# Patient Record
Sex: FEMALE | Race: WHITE | Employment: PART TIME | URBAN - METROPOLITAN AREA
[De-identification: names, ages, dates, MRNs, and addresses within clinical notes are randomized per-mention and may not be internally consistent; named-entity substitution may affect disease eponyms.]

---

## 2024-05-29 ENCOUNTER — LAB ENCOUNTER (OUTPATIENT)
Dept: LAB | Age: 39
End: 2024-05-29
Attending: PHYSICIAN ASSISTANT
Payer: COMMERCIAL

## 2024-05-29 DIAGNOSIS — Z00.00 ROUTINE GENERAL MEDICAL EXAMINATION AT A HEALTH CARE FACILITY: Primary | ICD-10-CM

## 2024-05-29 DIAGNOSIS — R53.83 FATIGUE: ICD-10-CM

## 2024-05-29 DIAGNOSIS — R79.0 LOW FERRITIN: ICD-10-CM

## 2024-05-29 LAB
ALBUMIN SERPL-MCNC: 4.3 G/DL (ref 3.4–5)
ALBUMIN/GLOB SERPL: 1.2 {RATIO} (ref 1–2)
ALP LIVER SERPL-CCNC: 88 U/L
ALT SERPL-CCNC: 23 U/L
ANION GAP SERPL CALC-SCNC: 7 MMOL/L (ref 0–18)
AST SERPL-CCNC: 24 U/L (ref 15–37)
BILIRUB SERPL-MCNC: 0.6 MG/DL (ref 0.1–2)
BUN BLD-MCNC: 10 MG/DL (ref 9–23)
CALCIUM BLD-MCNC: 9.2 MG/DL (ref 8.5–10.1)
CHLORIDE SERPL-SCNC: 109 MMOL/L (ref 98–112)
CHOLEST SERPL-MCNC: 152 MG/DL (ref ?–200)
CO2 SERPL-SCNC: 27 MMOL/L (ref 21–32)
CREAT BLD-MCNC: 0.77 MG/DL
EGFRCR SERPLBLD CKD-EPI 2021: 102 ML/MIN/1.73M2 (ref 60–?)
FASTING PATIENT LIPID ANSWER: YES
FASTING STATUS PATIENT QL REPORTED: YES
GLOBULIN PLAS-MCNC: 3.7 G/DL (ref 2.8–4.4)
GLUCOSE BLD-MCNC: 80 MG/DL (ref 70–99)
HDLC SERPL-MCNC: 48 MG/DL (ref 40–59)
IRON SERPL-MCNC: 123 UG/DL
LDLC SERPL CALC-MCNC: 93 MG/DL (ref ?–100)
NONHDLC SERPL-MCNC: 104 MG/DL (ref ?–130)
OSMOLALITY SERPL CALC.SUM OF ELEC: 294 MOSM/KG (ref 275–295)
POTASSIUM SERPL-SCNC: 3.7 MMOL/L (ref 3.5–5.1)
PROT SERPL-MCNC: 8 G/DL (ref 6.4–8.2)
SODIUM SERPL-SCNC: 143 MMOL/L (ref 136–145)
TRIGL SERPL-MCNC: 50 MG/DL (ref 30–149)
TSI SER-ACNC: 1.8 MIU/ML (ref 0.36–3.74)
VLDLC SERPL CALC-MCNC: 8 MG/DL (ref 0–30)

## 2024-05-29 PROCEDURE — 83540 ASSAY OF IRON: CPT

## 2024-05-29 PROCEDURE — 80061 LIPID PANEL: CPT

## 2024-05-29 PROCEDURE — 84443 ASSAY THYROID STIM HORMONE: CPT

## 2024-05-29 PROCEDURE — 36415 COLL VENOUS BLD VENIPUNCTURE: CPT

## 2024-05-29 PROCEDURE — 80053 COMPREHEN METABOLIC PANEL: CPT

## 2024-10-12 LAB
AMB EXT BILIRUBIN, TOTAL: 0.5 MG/DL
AMB EXT BUN: 11 MG/DL
AMB EXT CALCIUM: 10
AMB EXT CARBON DIOXIDE: 23
AMB EXT CHLORIDE: 99
AMB EXT CHOLESTEROL, TOTAL: 189 MG/DL
AMB EXT CMP ALT: 21 U/L
AMB EXT CMP AST: 22 U/L
AMB EXT CREATININE: 0.78 MG/DL
AMB EXT EGFR NON-AA: 100
AMB EXT GLUCOSE: 84 MG/DL
AMB EXT HDL CHOLESTEROL: 46 MG/DL
AMB EXT HEMATOCRIT: 44.9
AMB EXT HEMOGLOBIN: 14.6
AMB EXT HGBA1C: 5.5 %
AMB EXT LDL CHOLESTEROL, DIRECT: 127 MG/DL
AMB EXT MCV: 97
AMB EXT PLATELETS: 197
AMB EXT POSTASSIUM: 3.6 MMOL/L
AMB EXT SODIUM: 138 MMOL/L
AMB EXT TOTAL PROTEIN: 7.7
AMB EXT TRIGLYCERIDES: 90 MG/DL
AMB EXT VITAMIN D, 25-HYDROXY: 39.3
AMB EXT VLDL: 16 MG/DL
AMB EXT WBC: 5 X10(3)UL

## 2024-11-23 ENCOUNTER — OFFICE VISIT (OUTPATIENT)
Dept: INTERNAL MEDICINE CLINIC | Facility: CLINIC | Age: 39
End: 2024-11-23
Payer: COMMERCIAL

## 2024-11-23 ENCOUNTER — LAB ENCOUNTER (OUTPATIENT)
Dept: LAB | Age: 39
End: 2024-11-23
Attending: PHYSICIAN ASSISTANT
Payer: COMMERCIAL

## 2024-11-23 VITALS
RESPIRATION RATE: 16 BRPM | DIASTOLIC BLOOD PRESSURE: 62 MMHG | SYSTOLIC BLOOD PRESSURE: 114 MMHG | HEART RATE: 78 BPM | HEIGHT: 63.78 IN | TEMPERATURE: 98 F | WEIGHT: 142.19 LBS | OXYGEN SATURATION: 98 % | BODY MASS INDEX: 24.58 KG/M2

## 2024-11-23 DIAGNOSIS — R53.83 OTHER FATIGUE: ICD-10-CM

## 2024-11-23 DIAGNOSIS — G89.29 CHRONIC LEFT-SIDED LOW BACK PAIN WITHOUT SCIATICA: Primary | ICD-10-CM

## 2024-11-23 DIAGNOSIS — E53.8 B12 DEFICIENCY: ICD-10-CM

## 2024-11-23 DIAGNOSIS — Z78.9 VEGETARIAN DIET: ICD-10-CM

## 2024-11-23 DIAGNOSIS — R10.2 CHRONIC PELVIC PAIN IN FEMALE: ICD-10-CM

## 2024-11-23 DIAGNOSIS — N80.9 ENDOMETRIOSIS: ICD-10-CM

## 2024-11-23 DIAGNOSIS — G89.29 CHRONIC LEFT SI JOINT PAIN: ICD-10-CM

## 2024-11-23 DIAGNOSIS — Q79.60 EDS (EHLERS-DANLOS SYNDROME) (HCC): ICD-10-CM

## 2024-11-23 DIAGNOSIS — G89.29 CHRONIC PELVIC PAIN IN FEMALE: ICD-10-CM

## 2024-11-23 DIAGNOSIS — G43.809 OTHER MIGRAINE WITHOUT STATUS MIGRAINOSUS, NOT INTRACTABLE: ICD-10-CM

## 2024-11-23 DIAGNOSIS — M53.3 CHRONIC LEFT SI JOINT PAIN: ICD-10-CM

## 2024-11-23 DIAGNOSIS — M54.50 CHRONIC LEFT-SIDED LOW BACK PAIN WITHOUT SCIATICA: Primary | ICD-10-CM

## 2024-11-23 DIAGNOSIS — R63.5 WEIGHT GAIN: ICD-10-CM

## 2024-11-23 LAB
CRP SERPL-MCNC: <0.4 MG/DL (ref ?–0.5)
ERYTHROCYTE [SEDIMENTATION RATE] IN BLOOD: 20 MM/HR

## 2024-11-23 PROCEDURE — 85652 RBC SED RATE AUTOMATED: CPT | Performed by: PHYSICIAN ASSISTANT

## 2024-11-23 PROCEDURE — 3078F DIAST BP <80 MM HG: CPT | Performed by: PHYSICIAN ASSISTANT

## 2024-11-23 PROCEDURE — 3074F SYST BP LT 130 MM HG: CPT | Performed by: PHYSICIAN ASSISTANT

## 2024-11-23 PROCEDURE — 86140 C-REACTIVE PROTEIN: CPT | Performed by: PHYSICIAN ASSISTANT

## 2024-11-23 PROCEDURE — 3008F BODY MASS INDEX DOCD: CPT | Performed by: PHYSICIAN ASSISTANT

## 2024-11-23 PROCEDURE — 99215 OFFICE O/P EST HI 40 MIN: CPT | Performed by: PHYSICIAN ASSISTANT

## 2024-11-23 PROCEDURE — G2211 COMPLEX E/M VISIT ADD ON: HCPCS | Performed by: PHYSICIAN ASSISTANT

## 2024-11-23 RX ORDER — ATOGEPANT 60 MG/1
60 TABLET ORAL DAILY
COMMUNITY
Start: 2022-11-17
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
## 2024-12-03 ENCOUNTER — TELEPHONE (OUTPATIENT)
Dept: INTERNAL MEDICINE CLINIC | Facility: CLINIC | Age: 39
End: 2024-12-03

## 2024-12-03 LAB
FSH: 6.8
LH: 6.2
PROGESTERONE: 0.1 NG/ML
TESTOSTERONE: 28

## 2024-12-06 ENCOUNTER — HOSPITAL ENCOUNTER (OUTPATIENT)
Dept: GENERAL RADIOLOGY | Age: 39
Discharge: HOME | End: 2024-12-06
Attending: PHYSICIAN ASSISTANT
Payer: COMMERCIAL

## 2024-12-06 DIAGNOSIS — M53.3 CHRONIC LEFT SI JOINT PAIN: ICD-10-CM

## 2024-12-06 DIAGNOSIS — G89.29 CHRONIC LEFT-SIDED LOW BACK PAIN WITHOUT SCIATICA: ICD-10-CM

## 2024-12-06 DIAGNOSIS — G89.29 CHRONIC LEFT SI JOINT PAIN: ICD-10-CM

## 2024-12-06 DIAGNOSIS — M54.50 CHRONIC LEFT-SIDED LOW BACK PAIN WITHOUT SCIATICA: ICD-10-CM

## 2024-12-06 PROCEDURE — 72110 X-RAY EXAM L-2 SPINE 4/>VWS: CPT | Performed by: PHYSICIAN ASSISTANT

## 2024-12-06 PROCEDURE — 72202 X-RAY EXAM SI JOINTS 3/> VWS: CPT | Performed by: PHYSICIAN ASSISTANT

## 2024-12-19 ENCOUNTER — HOSPITAL ENCOUNTER (OUTPATIENT)
Dept: ULTRASOUND IMAGING | Age: 39
Discharge: HOME | End: 2024-12-19
Attending: PHYSICIAN ASSISTANT
Payer: COMMERCIAL

## 2024-12-19 DIAGNOSIS — G89.29 CHRONIC PELVIC PAIN IN FEMALE: ICD-10-CM

## 2024-12-19 DIAGNOSIS — N80.9 ENDOMETRIOSIS: ICD-10-CM

## 2024-12-19 DIAGNOSIS — R10.2 CHRONIC PELVIC PAIN IN FEMALE: ICD-10-CM

## 2024-12-19 PROCEDURE — 76830 TRANSVAGINAL US NON-OB: CPT | Performed by: PHYSICIAN ASSISTANT

## 2024-12-19 PROCEDURE — 76856 US EXAM PELVIC COMPLETE: CPT | Performed by: PHYSICIAN ASSISTANT

## 2025-01-13 ENCOUNTER — OFFICE VISIT (OUTPATIENT)
Facility: CLINIC | Age: 40
End: 2025-01-13
Payer: COMMERCIAL

## 2025-01-13 ENCOUNTER — TELEPHONE (OUTPATIENT)
Facility: CLINIC | Age: 40
End: 2025-01-13

## 2025-01-13 VITALS
BODY MASS INDEX: 24.61 KG/M2 | DIASTOLIC BLOOD PRESSURE: 68 MMHG | WEIGHT: 142.38 LBS | HEIGHT: 63.78 IN | HEART RATE: 88 BPM | SYSTOLIC BLOOD PRESSURE: 120 MMHG

## 2025-01-13 DIAGNOSIS — Z23 NONAVALENT HUMAN PAPILLOMA VIRUS (HPV) VACCINE FOR HPV TYPES 6, 11, 16, 18, 31, 33, 45, 52, AND 58 ADMINISTERED: ICD-10-CM

## 2025-01-13 DIAGNOSIS — N80.9 ENDOMETRIOSIS: ICD-10-CM

## 2025-01-13 DIAGNOSIS — R10.2 PELVIC PAIN: ICD-10-CM

## 2025-01-13 DIAGNOSIS — Z01.419 WELL WOMAN EXAM WITH ROUTINE GYNECOLOGICAL EXAM: Primary | ICD-10-CM

## 2025-01-13 NOTE — TELEPHONE ENCOUNTER
Spoke with patient. Let her know to call the office back when she decides and as for [facility] our Surgery Scheduler. Understanding verbalized.

## 2025-01-13 NOTE — H&P
[facility]  Obstetrics and Gynecology   History & Physical      Chief complaint:   Chief Complaint   Patient presents with    Wellness Visit     Last pap 23 neg per pt     Other     Reviewed Preventative/Wellness form with patient.     Consult     Consult hysterectomy        Subjective:     HPI: Abiola Lindsey is a 39 year old  presenting for WWE, endometriosis .    Her PCP is Mateo Coleman DO.    Endometriosis - pelvic pain, HMB, diarrhea, tail bone/back bone pain, no fertility issues though (dx after having children)   - Dx 2018 on lsc, started on POP  + low back and pelvic pain - improved after surgery and on being on POP  Has gained weight since being on POP.  Normally 110 lbs and now 142 lbs.  Vegetarian, exercises daily, no substances.   For the first 2 years gaining weight consistently and didn't realize it was linked to her birth control.      Menstrual migraines - gets fuzzy vision   - improved with POP, quilipta, hasn't needed ubrelvy.  Knows to avoid big triggers     Has been on mirena IUD and had horrible migraines and very painful periods in between her periods.      Menstrual history:  - is still taking the norethindrone   - no periods  - still having pelvic pain - increase in frequency, gets shooting pain with certain movements or coughing/sneeze  - has cyclic bloating even throughout the day    PCOS screen  - hirsutism, acne, male-pattern hair loss? Does have acne   - snoring -no , sleep study negative     Family planning / Preconception:  - satisfied parity     Sexual history:  - Sexually active? Yes   - Sexual satisfaction?:  not pleasurable, painful.  Painful on insertion and deep penetration.  Low libido.    - Sexual preference: men   - STD history: no   - Interested in STD testing: no   - Post coital bleeding: no     Gynecologic Hygiene:  - history of recurrent bacteria vaginosis, yeast, UTIs: couple of UTIs after daughter born - associated with stitches    - Vulvar: Water  - Douche? No   - Underwear fully covers vulva? Yes   - Underwear with cotton liner or made of cotton? Yes     Cancer Screening:  Family history of gynecologic cancers (including breast):  - maternal: mom had uterine cancer, sister had ovary removed 18-18 yo benign   - paternal: aunt  of breast cancer   Cervical CA:   - last pap/HPV: 23 normal   - due for one today? :  no   - History of abnl pap/HPV: 1x in between 2 kids - did not need colpo  - received HPV vaccine: doesn't think   Breast cancer:   - any breast issues today?: no   - last mammogram - never  - due for mammogram today? :-   Colon cancer:   - family history:   - last colonoscopy: -    Other screening:  Osteoporosis:   - family history: -  - last DEXA: -  Lifestyle:   - Nutrition: whole foods, vegetarian   - Exercise: everyday   - Sleep: 6-7 hours  - Stress mgt / coping strategies: activity, outdoor times, sunshine, reading, spending time with her kids   - People who support your health: 2 best girlfriends     OB History  OB History    Para Term  AB Living   2 2 2 0 0 2   SAB IAB Ectopic Multiple Live Births   0 0 0 0 2     OB History    Para Term  AB Living   2 2 2     2   SAB IAB Ectopic Multiple Live Births           2      # Outcome Date GA Lbr Heri/2nd Weight Sex Type Anes PTL Lv   2 Term 18 39w0d  8 lb 14 oz (4.026 kg)  NORMAL SPONT   JESSENIA      Birth Comments: hemorrhage 8 hour postpartum   1 Term 15 38w0d  8 lb (3.629 kg) M NORMAL SPONT   JESSENIA       ROS negative unless otherwise stated above    Meds:  Medications Ordered Prior to Encounter[1]    PMH:  Past Medical History:    Anxiety    Dysmenorrhea    Dyspareunia    Camelia-Danlos syndrome type III (HCC)    Endometriosis    Endometriosis    Esophageal reflux    Headache disorder    Migraine headache without aura    Migraines       All:  Allergies[2]    PSH:  Past Surgical History:   Procedure Laterality Date    D & c  2016     hemorrhage after delivery    Insert intrauterine device      Laparoscopy,pelvic,biopsy  2018          x 2    Other surgical history      d and c    Other surgical history      laparoscopy endometriosis    Remove intrauterine device      Boley teeth removed         Social History:  Social History     Socioeconomic History    Marital status:    Tobacco Use    Smoking status: Never     Passive exposure: Never    Smokeless tobacco: Never   Vaping Use    Vaping status: Never Used   Substance and Sexual Activity    Alcohol use: Never    Drug use: Never    Sexual activity: Yes     Partners: Male     Birth control/protection: OCP   Other Topics Concern    Caffeine Concern No    Exercise Yes     Comment: Feeling very fatigued - loss of endurance even with training    Seat Belt No    Special Diet Yes     Comment: vegetarian    Stress Concern No    Weight Concern Yes     Comment: consistent gain        Family History:  Family History   Problem Relation Age of Onset    Diabetes Father     Hypertension Father     Cancer Father         skin    Lipids Mother     Cancer Mother 60        skin and uterine    Uterine Cancer Mother 64    Psychiatric Maternal Grandmother 61        dementia    Other (Other) Maternal Grandfather         emphysema    Other (Other) Paternal Grandmother         multiple strokes    Heart Disorder Paternal Grandfather         MI    Diabetes Sister     Breast Cancer Paternal Aunt 48       Immunization History:  Immunization History   Administered Date(s) Administered    Covid-19 Vaccine Moderna 100 mcg/0.5 ml 2021, 2021    Covid-19 Vaccine Moderna 50 Mcg/0.25 Ml 2021    Covid-19 Vaccine Moderna Bivalent 50mcg/0.5mL 12+ years 2022    Influenza Vaccine, trivalent (IIV3), PF 0.5mL (53617) 10/18/2024    Pfizer Covid-19 Vaccine 30mcg/0.3ml 12yrs+ 2023, 10/18/2024    RHO(D) Immune Globulin 2013, 2013, 2016, 2016    TDAP 01/10/2017          Objective:     Vitals:    01/13/25 1146   BP: 120/68   Pulse: 88   Weight: 142 lb 6.4 oz (64.6 kg)   Height: 63.78\"       Body mass index is 24.61 kg/m².    Physical Exam:     General: normal appearance  HEENT: normocephalic, no male pattern baldness, no acne  Breast: normal contour, no masses or lesions, no nipple discharge, chest hair not seen  Respiratory: normal work of breathing, no extra use of accessory muscles  Cardiac: normal rate  Abdominal: Nontender to palpation, no masses palpated, lsc scars are well healed  MSK: normal range of motion  Neuro: normal movement, normal sensory  Skin: no abnormalities seen    Pelvic:  Speculum Exam:  - normal appearing vulva, perineum, anus  - normal appearing urethral meatus, urethra  - Stage V thomas pubic hair development  - normal appearing vagina, well estrogenized with ruggae, physiologic discharge  -  cervix without masses   Bimanual exam:  - uterus is mobile and with normal descent.  No masses appreciated.  No uterine or adnexal tenderness.  No bladder pain  - Pelvic floor is non tender      Labs:  Lab Results   Component Value Date    WBC 5.0 10/12/2024    RBC 4.69 01/10/2020    HGB 14.6 10/12/2024    HCT 44.9 10/12/2024    MCV 97 10/12/2024    MCH 30.7 01/10/2020    MCHC 33.2 01/10/2020    RDW 11.9 01/10/2020     10/12/2024        Lab Results   Component Value Date    GLU 84 10/12/2024    BUN 11 10/12/2024    CREATSERUM 0.78 10/12/2024    ANIONGAP 7 05/29/2024    GFRNAA 100 10/12/2024    CA 10.0 10/12/2024    OSMOCALC 294 05/29/2024    ALKPHO 88 05/29/2024    AST 22 10/12/2024    ALT 21 10/12/2024    BILT 0.5 10/12/2024    TP 7.7 10/12/2024    ALB 4.3 05/29/2024    GLOBULIN 3.7 05/29/2024     05/29/2024    K 3.6 10/12/2024    CL 99 10/12/2024    CO2 23 10/12/2024       Lab Results   Component Value Date    CHOLEST 189 10/12/2024    TRIG 90 10/12/2024    HDL 46 10/12/2024    LDL 93 05/29/2024    VLDL 16 10/12/2024    NONHDLC 104 05/29/2024         Lab Results   Component Value Date    T4F 1.44 2020    TSH 1.800 2024    FSH 6.8 10/12/2024    LH 6.2 10/12/2024        Lab Results   Component Value Date    A1C 5.5 10/12/2024         Imaging:  US PELVIS W EV (CPT=76856/96456)    Result Date: 2024  CONCLUSION:   1. 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  Pelvic MRI may be helpful for further characterization.  2. Normal endometrial thickness and echogenicity.  3. Normal sonographic appearance of the ovaries.    LOCATION:  [id number]   Dictated by (CST): Marilee Phillips MD on 2024 at 1:28 PM     Finalized by (CST): Marilee Pihllips MD on 2024 at 1:30 PM       XR SACROILIAC JOINTS (MIN 3 VIEWS) (CPT=72202)    Result Date: 2024  CONCLUSION:  No evidence of acute displaced fracture or dislocation in the sacroiliac joints.  LOCATION:  [facility]       Dictated by (CST): Stanton Jacome MD on 2024 at 9:05 AM     Finalized by (CST): Stanton Jacome MD on 2024 at 9:05 AM       XR LUMBAR SPINE (MIN 4 VIEWS) (CPT=72110)    Result Date: 2024  CONCLUSION:  No acute fracture or subluxation in the lumbar spine.   LOCATION:  [facility]   Dictated by (CST): Stanton Jacome MD on 2024 at 9:04 AM     Finalized by (CST): Stanton Jacome MD on 2024 at 9:05 AM         Assessment:     Abiola Lindsey is a 39 year old  female presenting for WWE, endometriosis.    #WWE  [X] well woman labs 10/12/2024 with outside provider    #pelvic pain  #endometriosis  #menstrual migraines  2018 lsc proven  Started on POPs 2018 - unhappy with weight gain   Failed other medication managemnet including: nuvaring, patch, IUD, COCPs  Patient considering hysterectomy for pelvic pain, AUB, failed medication mgt - will let office know      #STD screening  declines    #Cervical Cancer Screening  Pap and HPV negative    [ ] gardasil vaccine (available from 9 to 44 yo) today    #Breast Cancer Screening  [X] clinical breast exam  today    #Lifestyle counseling based on recommendations from the American College of Lifestyle Medicine  1) Nutrition: extensive scientific evidence supports a diet that is predominantly whole-food and plant based as an important strategy in health optimization.  Such a diet is rich in fiber, antioxidants and is nutrient dense (ex. Minimally processed vegetables, fruits, whole grains, legumes, nuts and seeds)  2) Physical activity: at least 150 minutes of moderate exercise per week (anything that gets your heart beating faster).  You could divide this time into 30 minutes per day for 5 days.  2 of these days should be devoted to whole body muscle-strengthening/building activities (weight lifting, for example).  3) Sleep: 7 to 9 hours per night of restorative sleep.  You can use black out curtains, white noise machine and minimize screen time to do this.    4) Continue to avoid or limit risky substances like alcohol, nicotine, vaping, drugs, prescription opioids.  If you need help - through medication or counseling - to do this, please contact me so I can get you a referral or resources to support you.  5) Stress: Continue developing coping strategies to decrease stress.  6) Leverage the power of relationships and social networks to help reinforce health behaviors.  Studies show people are more successful at achieving health goals if the people they live with are supporting and even working towards those same health goals.    Return of care in 1 year or PRN     Rose Mary Martinez MD   [facility] - OBGYN    Note to patient and family:  The 21st Century Cures Act makes medical notes available to patients in the interest of transparency.  However, please be advised that this is a medical document.  It is intended as a peer to peer communication.  It is written in medical language and may contain abbreviations or verbiage that are technical and unfamiliar.  It may appear blunt or direct.  Medical documents are intended to  carry relevant information, facts as evident, and the clinical opinion of the practitioner.         [1]   Current Outpatient Medications on File Prior to Visit   Medication Sig Dispense Refill    QULIPTA 60 MG Oral Tab Take 60 mg by mouth daily.      norethindrone 5 MG Oral Tab Take 1 tablet (5 mg total) by mouth daily. 90 tablet 4    Ketorolac Tromethamine 15.75 MG/SPRAY Nasal Solution by Nasal route.      Acidophilus/Pectin Oral Cap Take 1 capsule by mouth daily. (Patient not taking: Reported on 1/13/2025)      ELDERBERRY OR Take by mouth. (Patient not taking: Reported on 11/23/2024)       No current facility-administered medications on file prior to visit.   [2]   Allergies  Allergen Reactions    Cefuroxime SWELLING

## 2025-01-13 NOTE — TELEPHONE ENCOUNTER
----- Message from Rose Mary Martinez sent at 1/13/2025  3:00 PM CST -----  Regarding: patient interested in surgery  This patient is contemplating doing a robot assisted hysterectomy, bilateral salpingectomy and cystoscopy but would like to reflect and talk to  first.  Could you give her a number for surgery scheduling if she decides to move forward with surgery?  [facility]

## 2025-01-13 NOTE — H&P (VIEW-ONLY)
[facility]  Obstetrics and Gynecology   History & Physical      Chief complaint:   Chief Complaint   Patient presents with    Wellness Visit     Last pap 23 neg per pt     Other     Reviewed Preventative/Wellness form with patient.     Consult     Consult hysterectomy        Subjective:     HPI: Abiola Lindsey is a 39 year old  presenting for WWE, endometriosis .    Her PCP is Mateo Coleman DO.    Endometriosis - pelvic pain, HMB, diarrhea, tail bone/back bone pain, no fertility issues though (dx after having children)   - Dx 2018 on lsc, started on POP  + low back and pelvic pain - improved after surgery and on being on POP  Has gained weight since being on POP.  Normally 110 lbs and now 142 lbs.  Vegetarian, exercises daily, no substances.   For the first 2 years gaining weight consistently and didn't realize it was linked to her birth control.      Menstrual migraines - gets fuzzy vision   - improved with POP, quilipta, hasn't needed ubrelvy.  Knows to avoid big triggers     Has been on mirena IUD and had horrible migraines and very painful periods in between her periods.      Menstrual history:  - is still taking the norethindrone   - no periods  - still having pelvic pain - increase in frequency, gets shooting pain with certain movements or coughing/sneeze  - has cyclic bloating even throughout the day    PCOS screen  - hirsutism, acne, male-pattern hair loss? Does have acne   - snoring -no , sleep study negative     Family planning / Preconception:  - satisfied parity     Sexual history:  - Sexually active? Yes   - Sexual satisfaction?:  not pleasurable, painful.  Painful on insertion and deep penetration.  Low libido.    - Sexual preference: men   - STD history: no   - Interested in STD testing: no   - Post coital bleeding: no     Gynecologic Hygiene:  - history of recurrent bacteria vaginosis, yeast, UTIs: couple of UTIs after daughter born - associated with stitches    - Vulvar: Water  - Douche? No   - Underwear fully covers vulva? Yes   - Underwear with cotton liner or made of cotton? Yes     Cancer Screening:  Family history of gynecologic cancers (including breast):  - maternal: mom had uterine cancer, sister had ovary removed 18-20 yo benign   - paternal: aunt  of breast cancer   Cervical CA:   - last pap/HPV: 23 normal   - due for one today? :  no   - History of abnl pap/HPV: 1x in between 2 kids - did not need colpo  - received HPV vaccine: doesn't think   Breast cancer:   - any breast issues today?: no   - last mammogram - never  - due for mammogram today? :-   Colon cancer:   - family history:   - last colonoscopy: -    Other screening:  Osteoporosis:   - family history: -  - last DEXA: -  Lifestyle:   - Nutrition: whole foods, vegetarian   - Exercise: everyday   - Sleep: 6-7 hours  - Stress mgt / coping strategies: activity, outdoor times, sunshine, reading, spending time with her kids   - People who support your health: 2 best girlfriends     OB History  OB History    Para Term  AB Living   2 2 2 0 0 2   SAB IAB Ectopic Multiple Live Births   0 0 0 0 2     OB History    Para Term  AB Living   2 2 2     2   SAB IAB Ectopic Multiple Live Births           2      # Outcome Date GA Lbr Heri/2nd Weight Sex Type Anes PTL Lv   2 Term 16 39w0d  8 lb 14 oz (4.026 kg)  NORMAL SPONT   JESSENIA      Birth Comments: hemorrhage 8 hour postpartum   1 Term 13 38w0d  8 lb (3.629 kg) M NORMAL SPONT   JESSENIA       ROS negative unless otherwise stated above    Meds:  Medications Ordered Prior to Encounter[1]    PMH:  Past Medical History:    Anxiety    Dysmenorrhea    Dyspareunia    Camelia-Danlos syndrome type III (HCC)    Endometriosis    Endometriosis    Esophageal reflux    Headache disorder    Migraine headache without aura    Migraines       All:  Allergies[2]    PSH:  Past Surgical History:   Procedure Laterality Date    D & c  2016     hemorrhage after delivery    Insert intrauterine device      Laparoscopy,pelvic,biopsy  2018          x 2    Other surgical history      d and c    Other surgical history      laparoscopy endometriosis    Remove intrauterine device      [place] teeth removed         Social History:  Social History     Socioeconomic History    Marital status:    Tobacco Use    Smoking status: Never     Passive exposure: Never    Smokeless tobacco: Never   Vaping Use    Vaping status: Never Used   Substance and Sexual Activity    Alcohol use: Never    Drug use: Never    Sexual activity: Yes     Partners: Male     Birth control/protection: OCP   Other Topics Concern    Caffeine Concern No    Exercise Yes     Comment: Feeling very fatigued - loss of endurance even with training    Seat Belt No    Special Diet Yes     Comment: vegetarian    Stress Concern No    Weight Concern Yes     Comment: consistent gain        Family History:  Family History   Problem Relation Age of Onset    Diabetes Father     Hypertension Father     Cancer Father         skin    Lipids Mother     Cancer Mother 60        skin and uterine    Uterine Cancer Mother 64    Psychiatric Maternal Grandmother 61        dementia    Other (Other) Maternal Grandfather         emphysema    Other (Other) Paternal Grandmother         multiple strokes    Heart Disorder Paternal Grandfather         MI    Diabetes Sister     Breast Cancer Paternal Aunt 48       Immunization History:  Immunization History   Administered Date(s) Administered    Covid-19 Vaccine Moderna 100 mcg/0.5 ml 2021, 2021    Covid-19 Vaccine Moderna 50 Mcg/0.25 Ml 2021    Covid-19 Vaccine Moderna Bivalent 50mcg/0.5mL 12+ years 2022    Influenza Vaccine, trivalent (IIV3), PF 0.5mL (85811) 10/18/2024    Pfizer Covid-19 Vaccine 30mcg/0.3ml 12yrs+ 2023, 10/18/2024    RHO(D) Immune Globulin 2013, 2013, 2016, 2016    TDAP 01/10/2017          Objective:     Vitals:    01/13/25 1146   BP: 120/68   Pulse: 88   Weight: 142 lb 6.4 oz (64.6 kg)   Height: 63.78\"       Body mass index is 24.61 kg/m².    Physical Exam:     General: normal appearance  HEENT: normocephalic, no male pattern baldness, no acne  Breast: normal contour, no masses or lesions, no nipple discharge, chest hair not seen  Respiratory: normal work of breathing, no extra use of accessory muscles  Cardiac: normal rate  Abdominal: Nontender to palpation, no masses palpated, lsc scars are well healed  MSK: normal range of motion  Neuro: normal movement, normal sensory  Skin: no abnormalities seen    Pelvic:  Speculum Exam:  - normal appearing vulva, perineum, anus  - normal appearing urethral meatus, urethra  - Stage V thomas pubic hair development  - normal appearing vagina, well estrogenized with ruggae, physiologic discharge  -  cervix without masses   Bimanual exam:  - uterus is mobile and with normal descent.  No masses appreciated.  No uterine or adnexal tenderness.  No bladder pain  - Pelvic floor is non tender      Labs:  Lab Results   Component Value Date    WBC 5.0 10/12/2024    RBC 4.69 01/10/2020    HGB 14.6 10/12/2024    HCT 44.9 10/12/2024    MCV 97 10/12/2024    MCH 30.7 01/10/2020    MCHC 33.2 01/10/2020    RDW 11.9 01/10/2020     10/12/2024        Lab Results   Component Value Date    GLU 84 10/12/2024    BUN 11 10/12/2024    CREATSERUM 0.78 10/12/2024    ANIONGAP 7 05/29/2024    GFRNAA 100 10/12/2024    CA 10.0 10/12/2024    OSMOCALC 294 05/29/2024    ALKPHO 88 05/29/2024    AST 22 10/12/2024    ALT 21 10/12/2024    BILT 0.5 10/12/2024    TP 7.7 10/12/2024    ALB 4.3 05/29/2024    GLOBULIN 3.7 05/29/2024     05/29/2024    K 3.6 10/12/2024    CL 99 10/12/2024    CO2 23 10/12/2024       Lab Results   Component Value Date    CHOLEST 189 10/12/2024    TRIG 90 10/12/2024    HDL 46 10/12/2024    LDL 93 05/29/2024    VLDL 16 10/12/2024    NONHDLC 104 05/29/2024         Lab Results   Component Value Date    T4F 1.44 2020    TSH 1.800 2024    FSH 6.8 10/12/2024    LH 6.2 10/12/2024        Lab Results   Component Value Date    A1C 5.5 10/12/2024         Imaging:  US PELVIS W EV (CPT=76856/95306)    Result Date: 2024  CONCLUSION:   1. 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  Pelvic MRI may be helpful for further characterization.  2. Normal endometrial thickness and echogenicity.  3. Normal sonographic appearance of the ovaries.    LOCATION:  [id number]   Dictated by (CST): Marilee Phillips MD on 2024 at 1:28 PM     Finalized by (CST): Marilee Phillips MD on 2024 at 1:30 PM       XR SACROILIAC JOINTS (MIN 3 VIEWS) (CPT=72202)    Result Date: 2024  CONCLUSION:  No evidence of acute displaced fracture or dislocation in the sacroiliac joints.  LOCATION:  [facility]       Dictated by (CST): Stanton Jacome MD on 2024 at 9:05 AM     Finalized by (CST): Stanton Jacome MD on 2024 at 9:05 AM       XR LUMBAR SPINE (MIN 4 VIEWS) (CPT=72110)    Result Date: 2024  CONCLUSION:  No acute fracture or subluxation in the lumbar spine.   LOCATION:  [facility]   Dictated by (CST): Stanton Jacome MD on 2024 at 9:04 AM     Finalized by (CST): Stanton Jacome MD on 2024 at 9:05 AM         Assessment:     Abiola Lindsey is a 39 year old  female presenting for WWE, endometriosis.    #WWE  [X] well woman labs 10/12/2024 with outside provider    #pelvic pain  #endometriosis  #menstrual migraines  2018 lsc proven  Started on POPs 2018 - unhappy with weight gain   Failed other medication managemnet including: nuvaring, patch, IUD, COCPs  Patient considering hysterectomy for pelvic pain, AUB, failed medication mgt - will let office know      #STD screening  declines    #Cervical Cancer Screening  Pap and HPV negative    [ ] gardasil vaccine (available from 9 to 44 yo) today    #Breast Cancer Screening  [X] clinical breast exam  today    #Lifestyle counseling based on recommendations from the American College of Lifestyle Medicine  1) Nutrition: extensive scientific evidence supports a diet that is predominantly whole-food and plant based as an important strategy in health optimization.  Such a diet is rich in fiber, antioxidants and is nutrient dense (ex. Minimally processed vegetables, fruits, whole grains, legumes, nuts and seeds)  2) Physical activity: at least 150 minutes of moderate exercise per week (anything that gets your heart beating faster).  You could divide this time into 30 minutes per day for 5 days.  2 of these days should be devoted to whole body muscle-strengthening/building activities (weight lifting, for example).  3) Sleep: 7 to 9 hours per night of restorative sleep.  You can use black out curtains, white noise machine and minimize screen time to do this.    4) Continue to avoid or limit risky substances like alcohol, nicotine, vaping, drugs, prescription opioids.  If you need help - through medication or counseling - to do this, please contact me so I can get you a referral or resources to support you.  5) Stress: Continue developing coping strategies to decrease stress.  6) Leverage the power of relationships and social networks to help reinforce health behaviors.  Studies show people are more successful at achieving health goals if the people they live with are supporting and even working towards those same health goals.    Return of care in 1 year or PRN     Rose Mary Martinez MD   [facility] - OBGYN    Note to patient and family:  The 21st Century Cures Act makes medical notes available to patients in the interest of transparency.  However, please be advised that this is a medical document.  It is intended as a peer to peer communication.  It is written in medical language and may contain abbreviations or verbiage that are technical and unfamiliar.  It may appear blunt or direct.  Medical documents are intended to  carry relevant information, facts as evident, and the clinical opinion of the practitioner.         [1]   Current Outpatient Medications on File Prior to Visit   Medication Sig Dispense Refill    QULIPTA 60 MG Oral Tab Take 60 mg by mouth daily.      norethindrone 5 MG Oral Tab Take 1 tablet (5 mg total) by mouth daily. 90 tablet 4    Ketorolac Tromethamine 15.75 MG/SPRAY Nasal Solution by Nasal route.      Acidophilus/Pectin Oral Cap Take 1 capsule by mouth daily. (Patient not taking: Reported on 1/13/2025)      ELDERBERRY OR Take by mouth. (Patient not taking: Reported on 11/23/2024)       No current facility-administered medications on file prior to visit.   [2]   Allergies  Allergen Reactions    Cefuroxime SWELLING

## 2025-01-22 ENCOUNTER — TELEPHONE (OUTPATIENT)
Facility: CLINIC | Age: 40
End: 2025-01-22

## 2025-01-24 DIAGNOSIS — N80.9 ENDOMETRIOSIS: Primary | ICD-10-CM

## 2025-01-24 DIAGNOSIS — R10.2 PELVIC PAIN IN FEMALE: ICD-10-CM

## 2025-01-31 ENCOUNTER — LAB ENCOUNTER (OUTPATIENT)
Dept: LAB | Age: 40
End: 2025-01-31
Attending: STUDENT IN AN ORGANIZED HEALTH CARE EDUCATION/TRAINING PROGRAM
Payer: COMMERCIAL

## 2025-01-31 DIAGNOSIS — Z01.818 ENCOUNTER FOR PREADMISSION TESTING: ICD-10-CM

## 2025-01-31 LAB
ERYTHROCYTE [DISTWIDTH] IN BLOOD BY AUTOMATED COUNT: 11.9 %
HCT VFR BLD AUTO: 43.2 %
HGB BLD-MCNC: 15.1 G/DL
MCH RBC QN AUTO: 32.1 PG (ref 26–34)
MCHC RBC AUTO-ENTMCNC: 35 G/DL (ref 31–37)
MCV RBC AUTO: 91.7 FL
PLATELET # BLD AUTO: 198 10(3)UL (ref 150–450)
RBC # BLD AUTO: 4.71 X10(6)UL
WBC # BLD AUTO: 5.4 X10(3) UL (ref 4–11)

## 2025-01-31 PROCEDURE — 36415 COLL VENOUS BLD VENIPUNCTURE: CPT

## 2025-01-31 PROCEDURE — 85027 COMPLETE CBC AUTOMATED: CPT

## 2025-02-07 ENCOUNTER — ANESTHESIA (OUTPATIENT)
Dept: SURGERY | Facility: HOSPITAL | Age: 40
End: 2025-02-07
Payer: COMMERCIAL

## 2025-02-07 ENCOUNTER — ANESTHESIA EVENT (OUTPATIENT)
Dept: SURGERY | Facility: HOSPITAL | Age: 40
End: 2025-02-07
Payer: COMMERCIAL

## 2025-02-07 ENCOUNTER — HOSPITAL ENCOUNTER (OUTPATIENT)
Facility: HOSPITAL | Age: 40
Setting detail: HOSPITAL OUTPATIENT SURGERY
Discharge: HOME | End: 2025-02-07
Attending: STUDENT IN AN ORGANIZED HEALTH CARE EDUCATION/TRAINING PROGRAM | Admitting: STUDENT IN AN ORGANIZED HEALTH CARE EDUCATION/TRAINING PROGRAM
Payer: COMMERCIAL

## 2025-02-07 VITALS
BODY MASS INDEX: 25.52 KG/M2 | RESPIRATION RATE: 22 BRPM | HEIGHT: 63 IN | HEART RATE: 98 BPM | OXYGEN SATURATION: 99 % | WEIGHT: 144 LBS | SYSTOLIC BLOOD PRESSURE: 120 MMHG | TEMPERATURE: 97 F | DIASTOLIC BLOOD PRESSURE: 83 MMHG

## 2025-02-07 DIAGNOSIS — R10.2 PELVIC PAIN IN FEMALE: ICD-10-CM

## 2025-02-07 DIAGNOSIS — N80.9 ENDOMETRIOSIS: ICD-10-CM

## 2025-02-07 DIAGNOSIS — Z01.818 ENCOUNTER FOR PREADMISSION TESTING: Primary | ICD-10-CM

## 2025-02-07 LAB — B-HCG UR QL: NEGATIVE

## 2025-02-07 PROCEDURE — 81025 URINE PREGNANCY TEST: CPT

## 2025-02-07 PROCEDURE — 0UT74ZZ RESECTION OF BILATERAL FALLOPIAN TUBES, PERCUTANEOUS ENDOSCOPIC APPROACH: ICD-10-PCS | Performed by: STUDENT IN AN ORGANIZED HEALTH CARE EDUCATION/TRAINING PROGRAM

## 2025-02-07 PROCEDURE — 0UT94ZZ RESECTION OF UTERUS, PERCUTANEOUS ENDOSCOPIC APPROACH: ICD-10-PCS | Performed by: STUDENT IN AN ORGANIZED HEALTH CARE EDUCATION/TRAINING PROGRAM

## 2025-02-07 PROCEDURE — 88307 TISSUE EXAM BY PATHOLOGIST: CPT | Performed by: STUDENT IN AN ORGANIZED HEALTH CARE EDUCATION/TRAINING PROGRAM

## 2025-02-07 PROCEDURE — 8E0W4CZ ROBOTIC ASSISTED PROCEDURE OF TRUNK REGION, PERCUTANEOUS ENDOSCOPIC APPROACH: ICD-10-PCS | Performed by: STUDENT IN AN ORGANIZED HEALTH CARE EDUCATION/TRAINING PROGRAM

## 2025-02-07 RX ORDER — LIDOCAINE HYDROCHLORIDE 10 MG/ML
INJECTION, SOLUTION EPIDURAL; INFILTRATION; INTRACAUDAL; PERINEURAL AS NEEDED
Status: DISCONTINUED | OUTPATIENT
Start: 2025-02-07 | End: 2025-02-07 | Stop reason: SURG

## 2025-02-07 RX ORDER — HYDROCODONE BITARTRATE AND ACETAMINOPHEN 5; 325 MG/1; MG/1
1 TABLET ORAL ONCE AS NEEDED
Status: COMPLETED | OUTPATIENT
Start: 2025-02-07 | End: 2025-02-07

## 2025-02-07 RX ORDER — ACETAMINOPHEN 500 MG
1000 TABLET ORAL ONCE
Status: DISCONTINUED | OUTPATIENT
Start: 2025-02-07 | End: 2025-02-07 | Stop reason: HOSPADM

## 2025-02-07 RX ORDER — INSULIN ASPART 100 [IU]/ML
INJECTION, SOLUTION INTRAVENOUS; SUBCUTANEOUS
Status: COMPLETED
Start: 2025-02-07 | End: 2025-02-07

## 2025-02-07 RX ORDER — LABETALOL HYDROCHLORIDE 5 MG/ML
5 INJECTION, SOLUTION INTRAVENOUS EVERY 5 MIN PRN
Status: DISCONTINUED | OUTPATIENT
Start: 2025-02-07 | End: 2025-02-07

## 2025-02-07 RX ORDER — BUPIVACAINE HYDROCHLORIDE 2.5 MG/ML
INJECTION, SOLUTION EPIDURAL; INFILTRATION; INTRACAUDAL AS NEEDED
Status: DISCONTINUED | OUTPATIENT
Start: 2025-02-07 | End: 2025-02-07 | Stop reason: HOSPADM

## 2025-02-07 RX ORDER — ONDANSETRON 2 MG/ML
4 INJECTION INTRAMUSCULAR; INTRAVENOUS EVERY 6 HOURS PRN
Status: DISCONTINUED | OUTPATIENT
Start: 2025-02-07 | End: 2025-02-07

## 2025-02-07 RX ORDER — NALOXONE HYDROCHLORIDE 0.4 MG/ML
0.08 INJECTION, SOLUTION INTRAMUSCULAR; INTRAVENOUS; SUBCUTANEOUS AS NEEDED
Status: DISCONTINUED | OUTPATIENT
Start: 2025-02-07 | End: 2025-02-07

## 2025-02-07 RX ORDER — HYDROMORPHONE HYDROCHLORIDE 1 MG/ML
INJECTION, SOLUTION INTRAMUSCULAR; INTRAVENOUS; SUBCUTANEOUS
Status: COMPLETED
Start: 2025-02-07 | End: 2025-02-07

## 2025-02-07 RX ORDER — SODIUM CHLORIDE, SODIUM LACTATE, POTASSIUM CHLORIDE, CALCIUM CHLORIDE 600; 310; 30; 20 MG/100ML; MG/100ML; MG/100ML; MG/100ML
INJECTION, SOLUTION INTRAVENOUS CONTINUOUS
Status: DISCONTINUED | OUTPATIENT
Start: 2025-02-07 | End: 2025-02-07

## 2025-02-07 RX ORDER — ACETAMINOPHEN 500 MG
1000 TABLET ORAL ONCE AS NEEDED
Status: COMPLETED | OUTPATIENT
Start: 2025-02-07 | End: 2025-02-07

## 2025-02-07 RX ORDER — HYDROMORPHONE HYDROCHLORIDE 1 MG/ML
0.2 INJECTION, SOLUTION INTRAMUSCULAR; INTRAVENOUS; SUBCUTANEOUS EVERY 5 MIN PRN
Status: DISCONTINUED | OUTPATIENT
Start: 2025-02-07 | End: 2025-02-07

## 2025-02-07 RX ORDER — MIDAZOLAM HYDROCHLORIDE 1 MG/ML
INJECTION INTRAMUSCULAR; INTRAVENOUS AS NEEDED
Status: DISCONTINUED | OUTPATIENT
Start: 2025-02-07 | End: 2025-02-07 | Stop reason: SURG

## 2025-02-07 RX ORDER — GABAPENTIN 300 MG/1
300 CAPSULE ORAL EVERY 8 HOURS PRN
Qty: 20 CAPSULE | Refills: 0 | Status: SHIPPED | OUTPATIENT
Start: 2025-02-07

## 2025-02-07 RX ORDER — NEOSTIGMINE METHYLSULFATE 1 MG/ML
INJECTION INTRAVENOUS AS NEEDED
Status: DISCONTINUED | OUTPATIENT
Start: 2025-02-07 | End: 2025-02-07 | Stop reason: SURG

## 2025-02-07 RX ORDER — EPHEDRINE SULFATE 50 MG/ML
INJECTION INTRAVENOUS AS NEEDED
Status: DISCONTINUED | OUTPATIENT
Start: 2025-02-07 | End: 2025-02-07 | Stop reason: SURG

## 2025-02-07 RX ORDER — ONDANSETRON 4 MG/1
4 TABLET, ORALLY DISINTEGRATING ORAL EVERY 4 HOURS PRN
Qty: 20 TABLET | Refills: 0 | Status: SHIPPED | OUTPATIENT
Start: 2025-02-07

## 2025-02-07 RX ORDER — HEPARIN SODIUM 5000 [USP'U]/ML
5000 INJECTION, SOLUTION INTRAVENOUS; SUBCUTANEOUS ONCE
Status: COMPLETED | OUTPATIENT
Start: 2025-02-07 | End: 2025-02-07

## 2025-02-07 RX ORDER — HYDROMORPHONE HYDROCHLORIDE 1 MG/ML
0.4 INJECTION, SOLUTION INTRAMUSCULAR; INTRAVENOUS; SUBCUTANEOUS EVERY 5 MIN PRN
Status: DISCONTINUED | OUTPATIENT
Start: 2025-02-07 | End: 2025-02-07

## 2025-02-07 RX ORDER — DEXAMETHASONE SODIUM PHOSPHATE 4 MG/ML
VIAL (ML) INJECTION AS NEEDED
Status: DISCONTINUED | OUTPATIENT
Start: 2025-02-07 | End: 2025-02-07 | Stop reason: SURG

## 2025-02-07 RX ORDER — HYDROMORPHONE HYDROCHLORIDE 1 MG/ML
0.6 INJECTION, SOLUTION INTRAMUSCULAR; INTRAVENOUS; SUBCUTANEOUS EVERY 5 MIN PRN
Status: DISCONTINUED | OUTPATIENT
Start: 2025-02-07 | End: 2025-02-07

## 2025-02-07 RX ORDER — GENTAMICIN 40 MG/ML
INJECTION, SOLUTION INTRAMUSCULAR; INTRAVENOUS AS NEEDED
Status: DISCONTINUED | OUTPATIENT
Start: 2025-02-07 | End: 2025-02-07 | Stop reason: SURG

## 2025-02-07 RX ORDER — ACETAMINOPHEN 500 MG
1000 TABLET ORAL EVERY 6 HOURS PRN
Status: ON HOLD | COMMUNITY
End: 2025-02-07 | Stop reason: CLARIF

## 2025-02-07 RX ORDER — SCOPOLAMINE 1 MG/3D
1 PATCH, EXTENDED RELEASE TRANSDERMAL ONCE
Status: DISCONTINUED | OUTPATIENT
Start: 2025-02-07 | End: 2025-02-07

## 2025-02-07 RX ORDER — POLYETHYLENE GLYCOL 3350 17 G/17G
17 POWDER, FOR SOLUTION ORAL DAILY PRN
Qty: 30 EACH | Refills: 0 | Status: SHIPPED | OUTPATIENT
Start: 2025-02-07

## 2025-02-07 RX ORDER — GLYCOPYRROLATE 0.2 MG/ML
INJECTION, SOLUTION INTRAMUSCULAR; INTRAVENOUS AS NEEDED
Status: DISCONTINUED | OUTPATIENT
Start: 2025-02-07 | End: 2025-02-07 | Stop reason: SURG

## 2025-02-07 RX ORDER — VANCOMYCIN HYDROCHLORIDE 1 G/20ML
INJECTION, POWDER, LYOPHILIZED, FOR SOLUTION INTRAVENOUS
Status: DISCONTINUED
Start: 2025-02-07 | End: 2025-02-07 | Stop reason: WASHOUT

## 2025-02-07 RX ORDER — ONDANSETRON 2 MG/ML
INJECTION INTRAMUSCULAR; INTRAVENOUS AS NEEDED
Status: DISCONTINUED | OUTPATIENT
Start: 2025-02-07 | End: 2025-02-07 | Stop reason: SURG

## 2025-02-07 RX ORDER — ROCURONIUM BROMIDE 10 MG/ML
INJECTION, SOLUTION INTRAVENOUS AS NEEDED
Status: DISCONTINUED | OUTPATIENT
Start: 2025-02-07 | End: 2025-02-07 | Stop reason: SURG

## 2025-02-07 RX ORDER — PROCHLORPERAZINE EDISYLATE 5 MG/ML
5 INJECTION INTRAMUSCULAR; INTRAVENOUS EVERY 8 HOURS PRN
Status: DISCONTINUED | OUTPATIENT
Start: 2025-02-07 | End: 2025-02-07

## 2025-02-07 RX ORDER — HYDROCODONE BITARTRATE AND ACETAMINOPHEN 5; 325 MG/1; MG/1
2 TABLET ORAL ONCE AS NEEDED
Status: COMPLETED | OUTPATIENT
Start: 2025-02-07 | End: 2025-02-07

## 2025-02-07 RX ADMIN — LIDOCAINE HYDROCHLORIDE 50 MG: 10 INJECTION, SOLUTION EPIDURAL; INFILTRATION; INTRACAUDAL; PERINEURAL at 08:38:00

## 2025-02-07 RX ADMIN — SODIUM CHLORIDE, SODIUM LACTATE, POTASSIUM CHLORIDE, CALCIUM CHLORIDE: 600; 310; 30; 20 INJECTION, SOLUTION INTRAVENOUS at 10:21:00

## 2025-02-07 RX ADMIN — MIDAZOLAM HYDROCHLORIDE 2 MG: 1 INJECTION INTRAMUSCULAR; INTRAVENOUS at 08:38:00

## 2025-02-07 RX ADMIN — NEOSTIGMINE METHYLSULFATE 4 MG: 1 INJECTION INTRAVENOUS at 10:05:00

## 2025-02-07 RX ADMIN — DEXAMETHASONE SODIUM PHOSPHATE 4 MG: 4 MG/ML VIAL (ML) INJECTION at 09:01:00

## 2025-02-07 RX ADMIN — SODIUM CHLORIDE, SODIUM LACTATE, POTASSIUM CHLORIDE, CALCIUM CHLORIDE: 600; 310; 30; 20 INJECTION, SOLUTION INTRAVENOUS at 08:34:00

## 2025-02-07 RX ADMIN — ONDANSETRON 4 MG: 2 INJECTION INTRAMUSCULAR; INTRAVENOUS at 10:05:00

## 2025-02-07 RX ADMIN — ROCURONIUM BROMIDE 50 MG: 10 INJECTION, SOLUTION INTRAVENOUS at 08:38:00

## 2025-02-07 RX ADMIN — EPHEDRINE SULFATE 10 MG: 50 INJECTION INTRAVENOUS at 09:16:00

## 2025-02-07 RX ADMIN — ROCURONIUM BROMIDE 20 MG: 10 INJECTION, SOLUTION INTRAVENOUS at 09:22:00

## 2025-02-07 RX ADMIN — GENTAMICIN 280 MG: 40 INJECTION, SOLUTION INTRAMUSCULAR; INTRAVENOUS at 08:50:00

## 2025-02-07 RX ADMIN — GLYCOPYRROLATE 0.8 MG: 0.2 INJECTION, SOLUTION INTRAMUSCULAR; INTRAVENOUS at 10:05:00

## 2025-02-07 NOTE — DISCHARGE SUMMARY
[facility]   part of [facility]    Discharge Summary    Abiola Lindsey Patient Status:  Outpatient in a Bed    1985 MRN [id number]   Location [facility] SURGERY Attending Rose Mary Martinez,*   Hosp Day # 0 PCP Mateo Coleman DO       Date of Admission: 2025    Date of Discharge: 2025    Patient is a 39 year old s/p robot assisted total laparoscopic hysterectomy, bilateral salpingectomy  and cystoscopy for endometriosis and pelvic pain. Surgery and postoperative course were uncomplicated.  After meeting goals of postoperative care, patient was discharged home with planned follow up with her OB provider in within 2 weeks

## 2025-02-07 NOTE — INTERVAL H&P NOTE
Pre-op Diagnosis: Endometriosis [N80.9]  Pelvic pain in female [R10.2]    The above referenced H&P was reviewed by Rose Mary Martinez MD on 2/7/2025, the patient was examined and no significant changes have occurred in the patient's condition since the H&P was performed. Patient desires robotic assisted total laparoscopic hysterectomy, bilateral salpingectomy and cystoscopy for pelvic pain and endometriosis.   I discussed with the patient and/or legal representative the potential benefits, risks and side effects of this procedure; the likelihood of the patient achieving goals; and potential problems that might occur during recuperation.  I discussed reasonable alternatives to the procedure, including risks, benefits and side effects related to the alternatives and risks related to not receiving this procedure.  We will proceed with procedure as planned.

## 2025-02-07 NOTE — ANESTHESIA PREPROCEDURE EVALUATION
PRE-OP EVALUATION    Patient Name: Abiola Lindsey    Admit Diagnosis: Endometriosis [N80.9]  Pelvic pain in female [R10.2]    Pre-op Diagnosis: Endometriosis [N80.9]  Pelvic pain in female [R10.2]    robot assisted hysterectomy, bilateral salpingectomy and cystoscopy    Anesthesia Procedure: robot assisted hysterectomy, bilateral salpingectomy and cystoscopy (Bilateral: Abdomen)    Surgeons and Role:     * Rose Mary Martinez MD - Primary    Pre-op vitals reviewed.  Temp: 98.5 °F (36.9 °C)  Pulse: 79  Resp: 16  BP: 116/81  SpO2: 100 %  Body mass index is 25.51 kg/m².    Current medications reviewed.  Hospital Medications:   acetaminophen (Tylenol Extra Strength) tab 1,000 mg  1,000 mg Oral Once    scopolamine (Transderm-Scop) 1 MG/3DAYS patch 1 patch  1 patch Transdermal Once    lactated ringers infusion   Intravenous Continuous    [COMPLETED] heparin (Porcine) 5000 UNIT/ML injection 5,000 Units  5,000 Units Subcutaneous Once    vancomycin (Vancocin) 1,000 mg in sodium chloride 0.9% 250 mL IVPB-ADDV  15 mg/kg Intravenous Once    And    gentamicin (Garamycin) 280 mg in sodium chloride 0.9% 100 mL IVPB  5 mg/kg (Adjusted) Intravenous Once       Outpatient Medications:   Prescriptions Prior to Admission[1]    Allergies: Cefuroxime      Anesthesia Evaluation    Patient summary reviewed.    Anesthetic Complications  (-) history of anesthetic complications         GI/Hepatic/Renal      (+) GERD and well controlled                          Cardiovascular        Exercise tolerance: good     MET: >4      (-) hypertension     (-) CAD  (-) past MI                              Endo/Other      (-) diabetes     (-) hypothyroidism  (-) hyperthyroidism                     Pulmonary      (-) asthma       (-) pneumonia    (-) recent URI          Neuro/Psych          (-) CVA   (-) TIA  (-) seizures                       Past Surgical History:   Procedure Laterality Date    D & c  9/22/2016    hemorrhage after delivery     Insert intrauterine device      Laparoscopy,pelvic,biopsy  2018          x 2    Other surgical history      d and c    Other surgical history      laparoscopy endometriosis    Remove intrauterine device      [place] teeth removed       Social History     Socioeconomic History    Marital status:    Tobacco Use    Smoking status: Never     Passive exposure: Never    Smokeless tobacco: Never   Vaping Use    Vaping status: Never Used   Substance and Sexual Activity    Alcohol use: Never    Drug use: Never    Sexual activity: Yes     Partners: Male     Birth control/protection: OCP   Other Topics Concern    Caffeine Concern No    Exercise Yes     Comment: Feeling very fatigued - loss of endurance even with training    Seat Belt No    Special Diet Yes     Comment: vegetarian    Stress Concern No    Weight Concern Yes     Comment: consistent gain     History   Drug Use Unknown     Available pre-op labs reviewed.  Lab Results   Component Value Date    WBC 5.4 2025    RBC 4.71 2025    HGB 15.1 2025    HCT 43.2 2025    MCV 91.7 2025    MCH 32.1 2025    MCHC 35.0 2025    RDW 11.9 2025    .0 2025               Airway      Mallampati: I  Mouth opening: >3 FB  TM distance: > 6 cm  Neck ROM: full Cardiovascular      Rhythm: regular       Dental    Dentition appears grossly intact         Pulmonary    Pulmonary exam normal.                 Other findings              ASA: 1   Plan: general and regional  NPO status verified and           Plan/risks discussed with: patient            R/B/A were explained including but not limited to risk of aspiration, perioperative cardiac events, lip gum or teeth injury, anaphylactic reaction, corneal abrasion, PONV, sore throat. All questions answered and the patient agreed to proceed.       Present on Admission:  **None**             [1]   Medications Prior to Admission   Medication Sig Dispense Refill Last  Dose/Taking    acetaminophen 500 MG Oral Tab Take 2 tablets (1,000 mg total) by mouth every 6 (six) hours as needed for Pain.   2/7/2025 at  4:30 AM    QULIPTA 60 MG Oral Tab Take 60 mg by mouth daily.   2/6/2025    norethindrone 5 MG Oral Tab Take 1 tablet (5 mg total) by mouth daily. 90 tablet 4 2/6/2025

## 2025-02-07 NOTE — ANESTHESIA PROCEDURE NOTES
Airway  Date/Time: 2/7/2025 8:41 AM  Urgency: elective      General Information and Staff    Patient location during procedure: OR  Anesthesiologist: Shukri Israel MD  Performed: anesthesiologist   Performed by: Shukri Israel MD  Authorized by: Shukri Israel MD      Indications and Patient Condition  Indications for airway management: anesthesia  Sedation level: deep  Preoxygenated: yes  Patient position: sniffing  Mask difficulty assessment: 1 - vent by mask    Final Airway Details  Final airway type: endotracheal airway      Successful airway: ETT  Cuffed: yes   Successful intubation technique: direct laryngoscopy  Endotracheal tube insertion site: oral  Blade: Marcella  Blade size: #3  ETT size (mm): 7.0    Cormack-Lehane Classification: grade I - full view of glottis  Placement verified by: capnometry   Measured from: lips  ETT to lips (cm): 21  Number of attempts at approach: 1

## 2025-02-07 NOTE — ANESTHESIA POSTPROCEDURE EVALUATION
[facility]    Abiola Lindsey Patient Status:  Outpatient in a Bed   Age/Gender 39 year old female MRN [id number]   Location [facility] SURGERY Attending Rose Mary Martinez,*   Hosp Day # 0 PCP Mateo Coleman,        Anesthesia Post-op Note    robot assisted hysterectomy, bilateral salpingectomy and cystoscopy    Procedure Summary       Date: 02/07/25 Room / Location:  MAIN OR [address] [place] [address] MAIN OR    Anesthesia Start: 0834 Anesthesia Stop: 1021    Procedure: robot assisted hysterectomy, bilateral salpingectomy and cystoscopy (Bilateral: Abdomen) Diagnosis:       Endometriosis      Pelvic pain in female      (Endometriosis [N80.9]Pelvic pain in female [R10.2])    Surgeons: Rose Mary Martinez MD Anesthesiologist: Shukri Israel MD    Anesthesia Type: general, regional ASA Status: 1            Anesthesia Type: No value filed.    Vitals Value Taken Time   /84 02/07/25 1019   Temp 97.5 02/07/25 1024   Pulse 101 02/07/25 1023   Resp 16 02/07/25 1023   SpO2 100 % 02/07/25 1023   Vitals shown include unfiled device data.        Patient Location: PACU    Anesthesia Type: general    Airway Patency: patent    Postop Pain Control: adequate    Mental Status: preanesthetic baseline    Nausea/Vomiting: none    Cardiopulmonary/Hydration status: stable euvolemic    Complications: no apparent anesthesia related complications    Postop vital signs: stable    Dental Exam: Unchanged from Preop    Patient to be discharged from PACU when criteria met.

## 2025-02-07 NOTE — SPIRITUAL CARE NOTE
Spiritual Care Visit Note    Patient Name: Abiola Lindsey Date of Spiritual Care Visit: 25   : 1985 Primary Dx: <principal problem not specified>   Referred By:  Pre - Op Rounds    Spiritual Care Taxonomy:    Intended Effects: Establish rapport and connectedness    Methods: Encourage sharing of feelings    Interventions: Identify supportive relationship(s);Acknowledge current situation    Visit Type/Summary:  Abiola was visiting with her  when  arrived at her room.  She said she was calm regarding the anticipated procedure.   - Spiritual Care: Provided information regarding how to contact Spiritual Care.  remains available as needed for follow up.    Spiritual Care support can be requested via an Epic consult. For urgent/immediate needs, please contact the On Call  at: [facility]: [phone]    Diane Fletcher MDiv.  Staff

## 2025-02-07 NOTE — OPERATIVE REPORT
[facility]  Operative Note    Abiola Lindsey Patient Status:  Outpatient in a Bed    1985 MRN [id number]   Location [facility] SURGERY Attending Rose Mary Martinez,*   Hosp Day # 0 PCP Mateo Coleman, DO     2025    Preoperative Diagnosis:   Endometriosis [N80.9]  Pelvic pain in female [R10.2]    Postoperative Diagnosis:   Endometriosis [N80.9]  Pelvic pain in female [R10.2]    Primary surgeon: Rose Mary Martinez MD  Surgical Assistant: Surgical Assistant.: Gui Sahni    Procedure:   1) Robotic-assisted total laparoscopic hysterectomy  2) Bilateral salpingectomy  3) Cystoscopy     Anesthesia:General, TAP block  Complications: None; patient tolerated procedure well.  Antibiotics: vancomycin and gentamicin  Specimen: uterus, bilateral fallopian tubes, cervix  Condition: Stable  Estimated blood loss: 25 mL     Findings:     Physical Exam:  - Normal appearing vulva, vagina and cervix.      Laparoscopic Exam:   - normal appearing uterus with normal appearing adnexae.    - Normal appearing abdomen and pelvis.    - small endometriosis implants in ovarian fossa and on uterus     Cystoscopic Exam:  - normal bladder mucosa with bilateral ureteral jets noted.     Procedure:   Patient was brought to operating room where general anesthesia was administered without difficulty. The patient was then placed in the dorsal lithotomy position with her legs in the universal stirrups. Care was taken to ensure that all limbs and joints were cushioned and secure.  Exam under anesthesia was preformed. She was then prepped and draped in the usual fashion. Maldonado catheter was placed into the bladder. Speculum was placed in the vagina and the cervix was grasped with a sharp tooth tenaculum.  The cervix was dilatedd to accommodate the VCare uterine manipulator, which was then placed without difficulty.     Attention was then turned to the abdomen after changing gloves, an 8 mm transverse  incision was made. The abdominal wall was elevated manually. The abdomen was entered under direct visualization with camera inserted in the robotic trochar.  Insufflation was achieved.  No apparent injury to viscera or vessels was noted. An additional 4 robotic ports were placed on either side of the upper abdomen under direct visualiztion. The robot was then docked.      I then scrubbed out and sat at the robotic console.  The abdomen and pelvis were inspected. The ureters were identified at the pelvic brim on either side.     Salpingectomy only: The monopolar scissors and vessel sealer extend were used to clamp, cauterize and cut through the left mesosalpinx and the round ligament. The left broad and utero-ovarian ligament were cauterized and cut.  A bladder flap was created. The left cardinal ligaments and then the left uterine vessels were clamped, cauterized and cut at the cervico-uterine junction. The same technique was used on the right side.       The colpotomy cup was identified through the vaginal wall. The monopolar scissors on cutting current was used to perform the colpotomy incision.     The specimen was delivered vaginally and sent to pathology.  2-0 V-Loc suture was used to close the vaginal cuff in two layers with care to incorporate the uterosacral ligaments into the cuff closure. The vaginal cuff was noted to be hemostatic following irrigation. The robot was undocked.     Cystoscopy was performed. Both ureteral orifices were identified.  Strong rreteral jets noted bilaterally. The bladder appeared normal without sutures visualized.     The robotic ports were removed and the carbon dioxide was allowed to escape. The skin was closed using 4-0 monocryl in a subcuticular fashion. Exofin skin adhesive was applied. A vaginal exam was performed and no bleeding or lacerations were noted and the cuff was intact. The patient was awakened and brought back to the recovery room in stable condition. Instrument  count was correct.        Rose Mary Martinez MD  2/7/2025  10:18 AM

## 2025-02-07 NOTE — DISCHARGE INSTRUCTIONS
HYSTERECTOMY POSTOPERATIVE INSTRUCTIONS     ACTIVITY   - No heavy lifting/pushing/pulling for 6 weeks. Do not lift anything more than 10 lbs (such as laundry, groceries, children, pets), vacuum, push heavy doors or grocery carts, etc, for 6 weeks.   - You may climb stairs as tolerated.   - Do not put anything in the vagina for at least 6 weeks after surgery unless otherwise instructed by your doctor (including tampons, douching, sexual intercourse, etc).  - No driving for 1 week after surgery and not while taking narcotic pain medication. Drive defensively when you are ready.   - Avoid sitting or lying in bed for more than 2 hours at a time while you are awake to reduce your risk of blood clots.   - You may return to work when you are ready as long as you do not lift more than 10 pounds for 6 weeks. If you have a sedentary job or work from home 1-2 weeks before returning to work is appropriate. You may return to work in 2-4 weeks if your job requires a lot of movement. Please contact your doctor if you need any return to work letters or medical leave paperwork to be completed.     WOUND CARE   - Clear/pink drainage or a minimal amount of bleeding from incisions can be normal after surgery. Concerning drainage that is persistent, thick/cloudy, or foul smelling can be an indication of infection and should prompt you to call your doctor.   - If you had a laparoscopic or robotic hysterectomy, you will have small incisions on your abdomen. There will be dissolvable stitches under your skin that do not need to be removed. If you have band aids or tegaderms on your incisions, these may be removed when you shower. If you have steri-strips (paper tape) on the incisions, these may be removed in about 1-2 weeks. It is OK to remove them if they are falling off. If skin glue is present, leave in place for at least 2 weeks.  - If 2 weeks have passed and you have a visible stitch at a laparoscopic incision site it is OK for you to  cut it to remove it  - Shower daily after surgery. Clean your incision with mild antibacterial soap and water. Pat your incision dry with a clean towel. No tub baths or swimming pools for six weeks or until wound is completely healed.   - No ointments or antibacterial creams are required for incisions. Do NOT use cleansing agents like alcohol or hydrogen peroxide.   - Wash your hands frequently, especially before touching your incision or changing any dressings.     PAIN MANAGEMENT   - Post-operative pain after surgery is a challenging part of the healing process. Pain may be present for weeks but should gradually get better. Increasing pain or pain that is unbearable warrants evaluation by your doctor or in the emergency department. By state law we cannot immediately provide narcotic pain medication over the phone.  - Pain medications sent to your pharmacy: Tylenol, Ibuprofen, Gabapentin and possibly Oxycodone (if agreed upon with your doctor)  - You may either alternate or take together 500-1000 mg of Tylenol and 400 mg ibuprofen/Motrin. Each of these medications can be taken every six hours. Ibuprofen should be taken with food to avoid stomach ulcers.  The maximum dose of Tylenol is 3000 mg in 24 hours, the maximum dose of Motrin/ibuprofen is 2400mg in 24 hours.    - You may take the gabapentin and the oxycodone as prescribed.  But remember these medications are notorious for constipation, which can cause pain and you to strain while having a bowel movement.      CONSTIPATION  - Constipation is common and it is normal to not have a bowel movement for up to one week after surgery. You should still be passing gas despite constipation and should be able to tolerate both liquid and solid food without nausea or vomiting. Concerning symptoms would be constipation without gas, with fever, or nausea/vomiting and inability to eat. Call your doctor if these symptoms occur.  - Anti-constipation supplements/medications sent  to your pharmacy: Metamucil and Miralax  - Avoid straining due to constipation through adequate hydration (at least 8 fl oz of water per day) and a diet that incorporates whole foods that are plant-based.  If this is not enough to keep your stools a toothpaste-like consistency, please add over-the-counter fiber supplementation like Metamucil or a daily osmotic laxative like Miralax.  You can take one capful of Miralax with water or juice each morning and, as needed, in the evening.  While having a bowel movement, you can use can also use a stool under your feet or a squatty potty to help prevent straining.  - If you have loose or watery stools, stop taking the medications or titrate the dose of them down.    WHAT TO EXPECT AT HOME   - Recovery from surgery is generally 4-6 weeks, but sometimes longer for more strenuous activity. It is normal to be very tired during this time.   - It is normal to have some drainage or a small amount of vaginal bleeding after surgery that would require the use of a light pantiliner. This discharge may last up to 6 weeks. The bleeding and discharge should be light and should have no odor.   - You may experience gas pain, abdominal swelling, or shoulder pain for 24-72 hours after surgery. This is from the carbon dioxide gas put into your abdomen to better visualize your organs. A warm shower, heating pad, and/or walking may help.     WHEN TO CALL YOUR DOCTOR:   - Fever (>100.4°F or 38.0°C) or chills.   - Incision problems such as redness, warmth, swelling, or foul smelling drainage.   - Severe nausea or persistent vomiting.   - Bright red vaginal bleeding (soaking >1 pad/hour) or foul smelling vaginal drainage. IT IS NORMAL TO HAVE A MINIMAL AMOUNT OF VAGINAL SPOTTING OR VAGINAL DISCHARGE FOR SEVERAL WEEKS   - Severe pain not relieved with pain medication.   - Pain and swelling in your legs, especially if it is only on one side.   - Pain with urination, cloudy urine, or foul smelling  urine.   - Severe redness/irritation at sites where adhesive bandages were applied.   - Or if you have any other problems or questions.

## 2025-03-05 ENCOUNTER — OFFICE VISIT (OUTPATIENT)
Dept: OBGYN CLINIC | Facility: CLINIC | Age: 40
End: 2025-03-05
Payer: COMMERCIAL

## 2025-03-05 VITALS
SYSTOLIC BLOOD PRESSURE: 120 MMHG | WEIGHT: 143 LBS | HEIGHT: 63.78 IN | DIASTOLIC BLOOD PRESSURE: 76 MMHG | HEART RATE: 80 BPM | BODY MASS INDEX: 24.71 KG/M2

## 2025-03-05 DIAGNOSIS — Z98.890 POST-OPERATIVE STATE: Primary | ICD-10-CM

## 2025-03-05 PROCEDURE — 99024 POSTOP FOLLOW-UP VISIT: CPT | Performed by: STUDENT IN AN ORGANIZED HEALTH CARE EDUCATION/TRAINING PROGRAM

## 2025-03-05 PROCEDURE — 3074F SYST BP LT 130 MM HG: CPT | Performed by: STUDENT IN AN ORGANIZED HEALTH CARE EDUCATION/TRAINING PROGRAM

## 2025-03-05 PROCEDURE — 3078F DIAST BP <80 MM HG: CPT | Performed by: STUDENT IN AN ORGANIZED HEALTH CARE EDUCATION/TRAINING PROGRAM

## 2025-03-05 PROCEDURE — 3008F BODY MASS INDEX DOCD: CPT | Performed by: STUDENT IN AN ORGANIZED HEALTH CARE EDUCATION/TRAINING PROGRAM

## 2025-03-05 NOTE — PROGRESS NOTES
[facility]  Obstetrics and Gynecology   History & Physical      Chief complaint:   Chief Complaint   Patient presents with    Post-Op     robot assisted hysterectomy, bilateral salpingectomy and cystoscopy       Subjective:     HPI: Abiola Lindsey is a 39 year old  s/p  robot assisted total laparoscopic hysterecotmy, BS, cystoscopy for pelvic pain and endometriosis on 2025 is presenting for post-operative check    - Pathology:    Uterus and cervix, bilateral fallopian tubes:  -Intact uterus and cervix, 56 g.  -Cervix-chronic endocervicitis with mild eversion.  -Endometrium-thin endometrium with atrophic appearance.  -Myometrium-no lesions seen.  -Serosa-no endometriosis seen.  -Right fallopian tube-full-thickness fallopian tube, including fimbria, no diagnostic abnormalities.  -Left fallopian tube-full-thickness fallopian tube, including fimbria, with incidental surface inclusion cysts.    Post op course:  - Pain:  - Vaginal bleeding:  - Incision(s):   - Diet: regular  - BM: baseline  - Urinary: baseline   - Mood: doing well   - Return to sex?: not yet    ROS negative unless otherwise stated above    OB History  OB History    Para Term  AB Living   2 2 2 0 0 2   SAB IAB Ectopic Multiple Live Births   0 0 0 0 2     OB History    Para Term  AB Living   2 2 2     2   SAB IAB Ectopic Multiple Live Births           2      # Outcome Date GA Lbr Heri/2nd Weight Sex Type Anes PTL Lv   2 Term 16 39w0d  8 lb 14 oz (4.026 kg)  NORMAL SPONT   JESSENIA      Birth Comments: hemorrhage 8 hour postpartum   1 Term 13 38w0d  8 lb (3.629 kg) M NORMAL SPONT   JESSENIA       Meds:  Medications Ordered Prior to Encounter[1]    PMH:  Past Medical History:    Anxiety    Dysmenorrhea    Dyspareunia    Camelia-Danlos syndrome type III (HCC)    Endometriosis    Endometriosis    Esophageal reflux    Headache disorder    Hx of motion sickness    Migraine headache without aura     Migraines       All:  Allergies[2]    PSH:  Past Surgical History:   Procedure Laterality Date    D & c  2016    hemorrhage after delivery    Insert intrauterine device      Laparoscopy,pelvic,biopsy  2018          x 2    Other surgical history      d and c    Other surgical history      laparoscopy endometriosis    Remove intrauterine device      Harmony teeth removed         Social History:  Social History     Socioeconomic History    Marital status:    Tobacco Use    Smoking status: Never     Passive exposure: Never    Smokeless tobacco: Never   Vaping Use    Vaping status: Never Used   Substance and Sexual Activity    Alcohol use: Never    Drug use: Never    Sexual activity: Not Currently     Partners: Male     Birth control/protection: Hysterectomy   Other Topics Concern    Caffeine Concern No    Exercise Yes     Comment: Feeling very fatigued - loss of endurance even with training    Seat Belt No    Special Diet Yes     Comment: vegetarian    Stress Concern No    Weight Concern Yes     Comment: consistent gain        Family History:  Family History   Problem Relation Age of Onset    Diabetes Father     Hypertension Father     Cancer Father         skin    Lipids Mother     Cancer Mother 60        skin and uterine    Uterine Cancer Mother 64    Psychiatric Maternal Grandmother 61        dementia    Other (Other) Maternal Grandfather         emphysema    Other (Other) Paternal Grandmother         multiple strokes    Heart Disorder Paternal Grandfather         MI    Diabetes Sister     Breast Cancer Paternal Aunt 48         Objective:     Vitals:    25 1501   BP: 120/76   Pulse: 80   Weight: 143 lb (64.9 kg)   Height: 63.78\"       Body mass index is 24.72 kg/m².    Physical Exam:     General: normal appearance  HEENT: normocephalic  Breast: normal contour  Respiratory: normal work of breathing, no extra use of accessory muscles  Cardiac: normal rate  Abdominal: Nontender to  palpation, no masses palpated  Incisions: laparoscopic abdominal skin incisions are well-healed and well-reapproxumated   MSK: normal range of motion  Neuro: normal movement, normal sensory  Skin: no abnormalities seen      Labs:  Lab Results   Component Value Date    WBC 5.4 01/31/2025    RBC 4.71 01/31/2025    HGB 15.1 01/31/2025    HCT 43.2 01/31/2025    MCV 91.7 01/31/2025    MCH 32.1 01/31/2025    MCHC 35.0 01/31/2025    RDW 11.9 01/31/2025    .0 01/31/2025        Lab Results   Component Value Date    GLU 84 10/12/2024    BUN 11 10/12/2024    CREATSERUM 0.78 10/12/2024    ANIONGAP 7 05/29/2024    GFRNAA 100 10/12/2024    CA 10.0 10/12/2024    OSMOCALC 294 05/29/2024    ALKPHO 88 05/29/2024    AST 22 10/12/2024    ALT 21 10/12/2024    BILT 0.5 10/12/2024    TP 7.7 10/12/2024    ALB 4.3 05/29/2024    GLOBULIN 3.7 05/29/2024     05/29/2024    K 3.6 10/12/2024    CL 99 10/12/2024    CO2 23 10/12/2024       Assessment:     #post operative  - meeting goals of care  - plan follow up for 6 week vaginal cuff check     #Cervical cancer screen  The USPSTF recommends against screening for cervical cancer in women who have had a hysterectomy with removal of the cervix and do not have a history of a high-grade precancerous lesion (ie, cervical intraepithelial neoplasia [BRAYAN] grade 2 or 3) or cervical cancer. Continued surveillance is recommended for at least 25 years after treatment for HSIL, CIN2, CIN3 or adenocarcinoma insitu or high grade cytology or persistent atypical sqamous cells cannot exclude HSIL even if beyond the age of 65    #Lifestyle counseling based on recommendations from the American College of Lifestyle Medicine  1) Nutrition: extensive scientific evidence supports a diet that is predominantly whole-food and plant based as an important strategy in health optimization.  Such a diet is rich in fiber, antioxidants and is nutrient dense (ex. Minimally processed vegetables, fruits, whole grains,  legumes, nuts and seeds)  2) Physical activity: at least 150 minutes of moderate exercise per week (anything that gets your heart beating faster).  You could divide this time into 30 minutes per day for 5 days.  2 of these days should be devoted to whole body muscle-strengthening/building activities (weight lifting, for example).  3) Sleep: 7 to 9 hours per night of restorative sleep.  You can use black out curtains, white noise machine and minimize screen time to do this.    4) Continue to avoid or limit risky substances like alcohol, nicotine, vaping, drugs, prescription opioids.  If you need help - through medication or counseling - to do this, please contact me so I can get you a referral or resources to support you.  5) Stress: Continue developing coping strategies to decrease stress.  6) Leverage the power of relationships and social networks to help reinforce health behaviors.  Studies show people are more successful at achieving health goals if the people they live with are supporting and even working towards those same health goals.      Rose Mary Martinez MD   [facility] - OBGYN    Note to patient and family:  The 21st Century Cures Act makes medical notes available to patients in the interest of transparency.  However, please be advised that this is a medical document.  It is intended as a peer to peer communication.  It is written in medical language and may contain abbreviations or verbiage that are technical and unfamiliar.  It may appear blunt or direct.  Medical documents are intended to carry relevant information, facts as evident, and the clinical opinion of the practitioner.          [1]   Current Outpatient Medications on File Prior to Visit   Medication Sig Dispense Refill    QULIPTA 60 MG Oral Tab Take 60 mg by mouth daily.      polyethylene glycol, PEG 3350, 17 g Oral Powd Pack Take 17 g by mouth daily as needed. (Patient not taking: Reported on 3/5/2025) 30 each 0    gabapentin 300 MG Oral Cap  Take 1 capsule (300 mg total) by mouth every 8 (eight) hours as needed (pain). (Patient not taking: Reported on 3/5/2025) 20 capsule 0    ondansetron 4 MG Oral Tablet Dispersible Take 1 tablet (4 mg total) by mouth every 4 (four) hours as needed for Nausea. (Patient not taking: Reported on 3/5/2025) 20 tablet 0     No current facility-administered medications on file prior to visit.   [2]   Allergies  Allergen Reactions    Cefuroxime SWELLING     esophagitis

## 2025-03-25 ENCOUNTER — OFFICE VISIT (OUTPATIENT)
Facility: CLINIC | Age: 40
End: 2025-03-25
Payer: COMMERCIAL

## 2025-03-25 VITALS
BODY MASS INDEX: 24.37 KG/M2 | DIASTOLIC BLOOD PRESSURE: 70 MMHG | HEART RATE: 91 BPM | HEIGHT: 63.78 IN | SYSTOLIC BLOOD PRESSURE: 124 MMHG | WEIGHT: 141 LBS

## 2025-03-25 DIAGNOSIS — Z98.890 POST-OPERATIVE STATE: Primary | ICD-10-CM

## 2025-03-25 PROCEDURE — 3074F SYST BP LT 130 MM HG: CPT | Performed by: STUDENT IN AN ORGANIZED HEALTH CARE EDUCATION/TRAINING PROGRAM

## 2025-03-25 PROCEDURE — 99024 POSTOP FOLLOW-UP VISIT: CPT | Performed by: STUDENT IN AN ORGANIZED HEALTH CARE EDUCATION/TRAINING PROGRAM

## 2025-03-25 PROCEDURE — 3078F DIAST BP <80 MM HG: CPT | Performed by: STUDENT IN AN ORGANIZED HEALTH CARE EDUCATION/TRAINING PROGRAM

## 2025-03-25 PROCEDURE — 3008F BODY MASS INDEX DOCD: CPT | Performed by: STUDENT IN AN ORGANIZED HEALTH CARE EDUCATION/TRAINING PROGRAM

## 2025-03-25 NOTE — PROGRESS NOTES
[facility]  Obstetrics and Gynecology   History & Physical      Chief complaint:   Chief Complaint   Patient presents with    Follow - Up     6 week surgical follow-up   25: robot assisted hysterectomy, bilateral salpingectomy and cystoscopy       Subjective:     HPI: Abiola Lindsey is a 39 year old  s/p  robot assisted total laparoscopic hysterecotmy, BS, cystoscopy for pelvic pain and endometriosis on 2025 is presenting for post-operative check    - Pathology:    Uterus and cervix, bilateral fallopian tubes:  -Intact uterus and cervix, 56 g.  -Cervix-chronic endocervicitis with mild eversion.  -Endometrium-thin endometrium with atrophic appearance.  -Myometrium-no lesions seen.  -Serosa-no endometriosis seen.  -Right fallopian tube-full-thickness fallopian tube, including fimbria, no diagnostic abnormalities.  -Left fallopian tube-full-thickness fallopian tube, including fimbria, with incidental surface inclusion cysts.    Post op course:  - Pain: doing well   - Vaginal bleeding: none   - Incision(s): no issues   - Diet: regular  - BM: baseline  - Urinary: baseline   - Mood: doing well   - Return to sex?: not yet    ROS negative unless otherwise stated above    OB History  OB History    Para Term  AB Living   2 2 2 0 0 2   SAB IAB Ectopic Multiple Live Births   0 0 0 0 2     OB History    Para Term  AB Living   2 2 2     2   SAB IAB Ectopic Multiple Live Births           2      # Outcome Date GA Lbr Heri/2nd Weight Sex Type Anes PTL Lv   2 Term 16 39w0d  8 lb 14 oz (4.026 kg)  NORMAL SPONT   JESSENIA      Birth Comments: hemorrhage 8 hour postpartum   1 Term 13 38w0d  8 lb (3.629 kg) M NORMAL SPONT   JESSENIA       Meds:  Medications Ordered Prior to Encounter[1]    PMH:  Past Medical History:    Anxiety    Dysmenorrhea    Dyspareunia    Camelia-Danlos syndrome type III (HCC)    Endometriosis    Endometriosis    Esophageal reflux    Headache disorder     Hx of motion sickness    Migraine headache without aura    Migraines       All:  Allergies[2]    PSH:  Past Surgical History:   Procedure Laterality Date    D & c  2016    hemorrhage after delivery    Hysterectomy  2025    Insert intrauterine device      Laparoscopy,pelvic,biopsy  2018          x 2    Other surgical history      d and c    Other surgical history      laparoscopy endometriosis    Remove intrauterine device      [place] teeth removed         Social History:  Social History     Socioeconomic History    Marital status:    Tobacco Use    Smoking status: Never     Passive exposure: Never    Smokeless tobacco: Never   Vaping Use    Vaping status: Never Used   Substance and Sexual Activity    Alcohol use: Not Currently    Drug use: Never    Sexual activity: Not Currently     Partners: Male     Birth control/protection: Hysterectomy   Other Topics Concern    Caffeine Concern No    Exercise Yes     Comment: Feeling very fatigued - loss of endurance even with training    Seat Belt No    Special Diet Yes     Comment: vegetarian    Stress Concern No    Weight Concern Yes     Comment: consistent gain        Family History:  Family History   Problem Relation Age of Onset    Diabetes Father     Hypertension Father     Cancer Father         skin    Lipids Mother     Cancer Mother 60        skin and uterine    Uterine Cancer Mother 64    Psychiatric Maternal Grandmother 61        dementia    Other (Other) Maternal Grandfather         emphysema    Other (Other) Paternal Grandmother         multiple strokes    Heart Disorder Paternal Grandfather         MI    Diabetes Sister     Breast Cancer Paternal Aunt 48         Objective:     Vitals:    25 1050   BP: 124/70   Pulse: 91   Weight: 141 lb (64 kg)   Height: 63.78\"       Body mass index is 24.37 kg/m².    Physical Exam:     General: normal appearance  HEENT: normocephalic  Breast: normal contour  Respiratory: normal work of  breathing, no extra use of accessory muscles  Cardiac: normal rate  Abdominal: Nontender to palpation, no masses palpated  Incisions: laparoscopic abdominal skin incisions are well-healed and well-reapproxumated   MSK: normal range of motion  Neuro: normal movement, normal sensory  Skin: no abnormalities seen    Pelvic  Normal external genitalia, vagina  Vaginal cuff with granulation tissue on the right aspect    Labs:  Lab Results   Component Value Date    WBC 5.4 01/31/2025    RBC 4.71 01/31/2025    HGB 15.1 01/31/2025    HCT 43.2 01/31/2025    MCV 91.7 01/31/2025    MCH 32.1 01/31/2025    MCHC 35.0 01/31/2025    RDW 11.9 01/31/2025    .0 01/31/2025        Lab Results   Component Value Date    GLU 84 10/12/2024    BUN 11 10/12/2024    CREATSERUM 0.78 10/12/2024    ANIONGAP 7 05/29/2024    GFRNAA 100 10/12/2024    CA 10.0 10/12/2024    OSMOCALC 294 05/29/2024    ALKPHO 88 05/29/2024    AST 22 10/12/2024    ALT 21 10/12/2024    BILT 0.5 10/12/2024    TP 7.7 10/12/2024    ALB 4.3 05/29/2024    GLOBULIN 3.7 05/29/2024     05/29/2024    K 3.6 10/12/2024    CL 99 10/12/2024    CO2 23 10/12/2024       Assessment:     #post operative  - follow up on 04/14/2025 at 1415 for cuff re-check    #Cervical cancer screen  The USPSTF recommends against screening for cervical cancer in women who have had a hysterectomy with removal of the cervix and do not have a history of a high-grade precancerous lesion (ie, cervical intraepithelial neoplasia [BRAYAN] grade 2 or 3) or cervical cancer. Continued surveillance is recommended for at least 25 years after treatment for HSIL, CIN2, CIN3 or adenocarcinoma insitu or high grade cytology or persistent atypical sqamous cells cannot exclude HSIL even if beyond the age of 65    #Lifestyle counseling based on recommendations from the American College of Lifestyle Medicine  1) Nutrition: extensive scientific evidence supports a diet that is predominantly whole-food and plant based as  an important strategy in health optimization.  Such a diet is rich in fiber, antioxidants and is nutrient dense (ex. Minimally processed vegetables, fruits, whole grains, legumes, nuts and seeds)  2) Physical activity: at least 150 minutes of moderate exercise per week (anything that gets your heart beating faster).  You could divide this time into 30 minutes per day for 5 days.  2 of these days should be devoted to whole body muscle-strengthening/building activities (weight lifting, for example).  3) Sleep: 7 to 9 hours per night of restorative sleep.  You can use black out curtains, white noise machine and minimize screen time to do this.    4) Continue to avoid or limit risky substances like alcohol, nicotine, vaping, drugs, prescription opioids.  If you need help - through medication or counseling - to do this, please contact me so I can get you a referral or resources to support you.  5) Stress: Continue developing coping strategies to decrease stress.  6) Leverage the power of relationships and social networks to help reinforce health behaviors.  Studies show people are more successful at achieving health goals if the people they live with are supporting and even working towards those same health goals.      Rose Mary Martinez MD   [facility] - OBGYN    Note to patient and family:  The 21st Century Cures Act makes medical notes available to patients in the interest of transparency.  However, please be advised that this is a medical document.  It is intended as a peer to peer communication.  It is written in medical language and may contain abbreviations or verbiage that are technical and unfamiliar.  It may appear blunt or direct.  Medical documents are intended to carry relevant information, facts as evident, and the clinical opinion of the practitioner.          [1]   Current Outpatient Medications on File Prior to Visit   Medication Sig Dispense Refill    QULIPTA 60 MG Oral Tab Take 60 mg by mouth daily.        No current facility-administered medications on file prior to visit.   [2]   Allergies  Allergen Reactions    Cefuroxime SWELLING     esophagitis

## 2025-04-16 ENCOUNTER — OFFICE VISIT (OUTPATIENT)
Dept: OBGYN CLINIC | Facility: CLINIC | Age: 40
End: 2025-04-16
Payer: COMMERCIAL

## 2025-04-16 VITALS
HEIGHT: 63.78 IN | WEIGHT: 140.63 LBS | HEART RATE: 65 BPM | DIASTOLIC BLOOD PRESSURE: 81 MMHG | SYSTOLIC BLOOD PRESSURE: 113 MMHG | BODY MASS INDEX: 24.31 KG/M2

## 2025-04-16 DIAGNOSIS — Z23 NONAVALENT HUMAN PAPILLOMA VIRUS (HPV) VACCINE FOR HPV TYPES 6, 11, 16, 18, 31, 33, 45, 52, AND 58 ADMINISTERED: ICD-10-CM

## 2025-04-16 DIAGNOSIS — Z98.890 POST-OPERATIVE STATE: Primary | ICD-10-CM

## 2025-04-16 DIAGNOSIS — L70.0 ACNE VULGARIS: ICD-10-CM

## 2025-04-16 PROCEDURE — 3074F SYST BP LT 130 MM HG: CPT | Performed by: STUDENT IN AN ORGANIZED HEALTH CARE EDUCATION/TRAINING PROGRAM

## 2025-04-16 PROCEDURE — 3008F BODY MASS INDEX DOCD: CPT | Performed by: STUDENT IN AN ORGANIZED HEALTH CARE EDUCATION/TRAINING PROGRAM

## 2025-04-16 PROCEDURE — 99214 OFFICE O/P EST MOD 30 MIN: CPT | Performed by: STUDENT IN AN ORGANIZED HEALTH CARE EDUCATION/TRAINING PROGRAM

## 2025-04-16 PROCEDURE — 90471 IMMUNIZATION ADMIN: CPT | Performed by: STUDENT IN AN ORGANIZED HEALTH CARE EDUCATION/TRAINING PROGRAM

## 2025-04-16 PROCEDURE — 90651 9VHPV VACCINE 2/3 DOSE IM: CPT | Performed by: STUDENT IN AN ORGANIZED HEALTH CARE EDUCATION/TRAINING PROGRAM

## 2025-04-16 PROCEDURE — 3079F DIAST BP 80-89 MM HG: CPT | Performed by: STUDENT IN AN ORGANIZED HEALTH CARE EDUCATION/TRAINING PROGRAM

## 2025-04-16 PROCEDURE — 99459 PELVIC EXAMINATION: CPT | Performed by: STUDENT IN AN ORGANIZED HEALTH CARE EDUCATION/TRAINING PROGRAM

## 2025-04-16 RX ORDER — SPIRONOLACTONE 100 MG/1
100 TABLET, FILM COATED ORAL DAILY
Qty: 30 TABLET | Refills: 11 | Status: SHIPPED | OUTPATIENT
Start: 2025-04-16

## 2025-04-16 NOTE — PROGRESS NOTES
[facility]  Obstetrics and Gynecology   History & Physical      Chief complaint:   Chief Complaint   Patient presents with    Follow - Up     robot assisted hysterectomy, bilateral salpingectomy and cystoscopy       Subjective:     HPI: Abiola Lindsey is a 39 year old  s/p  robot assisted total laparoscopic hysterecotmy, BS, cystoscopy for pelvic pain and endometriosis on 2025 is presenting for post-operative check    - Pathology:    Uterus and cervix, bilateral fallopian tubes:  -Intact uterus and cervix, 56 g.  -Cervix-chronic endocervicitis with mild eversion.  -Endometrium-thin endometrium with atrophic appearance.  -Myometrium-no lesions seen.  -Serosa-no endometriosis seen.  -Right fallopian tube-full-thickness fallopian tube, including fimbria, no diagnostic abnormalities.  -Left fallopian tube-full-thickness fallopian tube, including fimbria, with incidental surface inclusion cysts.    Post op course:  - Pain: doing well   - Vaginal bleeding: none   - Incision(s): no issues   - Diet: regular  - BM: baseline  - Urinary: baseline   - Mood: doing well   - Return to sex?: not yet    ROS negative unless otherwise stated above    OB History  OB History    Para Term  AB Living   2 2 2 0 0 2   SAB IAB Ectopic Multiple Live Births   0 0 0 0 2     OB History    Para Term  AB Living   2 2 2   2   SAB IAB Ectopic Multiple Live Births       2      # Outcome Date GA Lbr Heri/2nd Weight Sex Type Anes PTL Lv   2 Term 16 39w0d  8 lb 14 oz (4.026 kg)  NORMAL SPONT   JESSENIA      Birth Comments: hemorrhage 8 hour postpartum   1 Term 13 38w0d  8 lb (3.629 kg) M NORMAL SPONT   JESSENIA       Meds:  Medications Ordered Prior to Encounter[1]    PMH:  Past Medical History:    Anxiety    Dysmenorrhea    Dyspareunia    Camelia-Danlos syndrome type III (HCC)    Endometriosis    Endometriosis    Esophageal reflux    Headache disorder    Hx of motion sickness    Migraine  headache without aura    Migraines       All:  Allergies[2]    PSH:  Past Surgical History:   Procedure Laterality Date    D & c  2016    hemorrhage after delivery    Hysterectomy  2025    Insert intrauterine device      Laparoscopy,pelvic,biopsy  2018          x 2    Other surgical history      d and c    Other surgical history      laparoscopy endometriosis    Remove intrauterine device      [place] teeth removed         Social History:  Social History     Socioeconomic History    Marital status:    Tobacco Use    Smoking status: Never     Passive exposure: Never    Smokeless tobacco: Never   Vaping Use    Vaping status: Never Used   Substance and Sexual Activity    Alcohol use: Not Currently    Drug use: Never    Sexual activity: Not Currently     Partners: Male     Birth control/protection: Hysterectomy   Other Topics Concern    Caffeine Concern No    Exercise Yes     Comment: Feeling very fatigued - loss of endurance even with training    Seat Belt No    Special Diet Yes     Comment: vegetarian    Stress Concern No    Weight Concern Yes     Comment: consistent gain        Family History:  Family History   Problem Relation Age of Onset    Diabetes Father     Hypertension Father     Cancer Father         skin    Lipids Mother     Cancer Mother 60        skin and uterine    Uterine Cancer Mother 64    Psychiatric Maternal Grandmother 61        dementia    Other (Other) Maternal Grandfather         emphysema    Other (Other) Paternal Grandmother         multiple strokes    Heart Disorder Paternal Grandfather         MI    Diabetes Sister     Breast Cancer Paternal Aunt 48         Objective:     Vitals:    25 1405   BP: 113/81   Pulse: 65   Weight: 140 lb 9.6 oz (63.8 kg)   Height: 63.78\"       Body mass index is 24.3 kg/m².    Physical Exam:     General: normal appearance  HEENT: normocephalic  Breast: normal contour  Respiratory: normal work of breathing, no extra use of  accessory muscles  Cardiac: normal rate  Abdominal: Nontender to palpation, no masses palpated  Incisions: laparoscopic abdominal skin incisions are well-healed and well-reapproxumated   MSK: normal range of motion  Neuro: normal movement, normal sensory  Skin: no abnormalities seen    Pelvic  Normal external genitalia, vagina  Vaginal cuff no longer with granulation tissue - well healed, blue frayed suture noted     Labs:  Lab Results   Component Value Date    WBC 5.4 01/31/2025    RBC 4.71 01/31/2025    HGB 15.1 01/31/2025    HCT 43.2 01/31/2025    MCV 91.7 01/31/2025    MCH 32.1 01/31/2025    MCHC 35.0 01/31/2025    RDW 11.9 01/31/2025    .0 01/31/2025        Lab Results   Component Value Date    GLU 84 10/12/2024    BUN 11 10/12/2024    CREATSERUM 0.78 10/12/2024    ANIONGAP 7 05/29/2024    GFRNAA 100 10/12/2024    CA 10.0 10/12/2024    OSMOCALC 294 05/29/2024    ALKPHO 88 05/29/2024    AST 22 10/12/2024    ALT 21 10/12/2024    BILT 0.5 10/12/2024    TP 7.7 10/12/2024    ALB 4.3 05/29/2024    GLOBULIN 3.7 05/29/2024     05/29/2024    K 3.6 10/12/2024    CL 99 10/12/2024    CO2 23 10/12/2024       Assessment:     #post operative  - follow up on 04/14/2025 at 1415 for cuff re-check    #Cervical cancer screen  The USPSTF recommends against screening for cervical cancer in women who have had a hysterectomy with removal of the cervix and do not have a history of a high-grade precancerous lesion (ie, cervical intraepithelial neoplasia [BRAYAN] grade 2 or 3) or cervical cancer. Continued surveillance is recommended for at least 25 years after treatment for HSIL, CIN2, CIN3 or adenocarcinoma insitu or high grade cytology or persistent atypical sqamous cells cannot exclude HSIL even if beyond the age of 65  [ ] plan gardasil #1 today    #hormonal acne  [ ] spironalactone sent to pharmacy     #Lifestyle counseling based on recommendations from the American College of Lifestyle Medicine  1) Nutrition: extensive  scientific evidence supports a diet that is predominantly whole-food and plant based as an important strategy in health optimization.  Such a diet is rich in fiber, antioxidants and is nutrient dense (ex. Minimally processed vegetables, fruits, whole grains, legumes, nuts and seeds)  2) Physical activity: at least 150 minutes of moderate exercise per week (anything that gets your heart beating faster).  You could divide this time into 30 minutes per day for 5 days.  2 of these days should be devoted to whole body muscle-strengthening/building activities (weight lifting, for example).  3) Sleep: 7 to 9 hours per night of restorative sleep.  You can use black out curtains, white noise machine and minimize screen time to do this.    4) Continue to avoid or limit risky substances like alcohol, nicotine, vaping, drugs, prescription opioids.  If you need help - through medication or counseling - to do this, please contact me so I can get you a referral or resources to support you.  5) Stress: Continue developing coping strategies to decrease stress.  6) Leverage the power of relationships and social networks to help reinforce health behaviors.  Studies show people are more successful at achieving health goals if the people they live with are supporting and even working towards those same health goals.      Rose Mary Martinez MD   [facility] - OBGYN    Note to patient and family:  The 21st Century Cures Act makes medical notes available to patients in the interest of transparency.  However, please be advised that this is a medical document.  It is intended as a peer to peer communication.  It is written in medical language and may contain abbreviations or verbiage that are technical and unfamiliar.  It may appear blunt or direct.  Medical documents are intended to carry relevant information, facts as evident, and the clinical opinion of the practitioner.          [1]   Current Outpatient Medications on File Prior to Visit    Medication Sig Dispense Refill    QULIPTA 60 MG Oral Tab Take 60 mg by mouth daily.       No current facility-administered medications on file prior to visit.   [2]   Allergies  Allergen Reactions    Cefuroxime SWELLING     esophagitis

## 2025-05-05 ENCOUNTER — TELEPHONE (OUTPATIENT)
Dept: INTERNAL MEDICINE CLINIC | Facility: CLINIC | Age: 40
End: 2025-05-05

## 2025-05-05 ENCOUNTER — NURSE TRIAGE (OUTPATIENT)
Dept: INTERNAL MEDICINE CLINIC | Facility: CLINIC | Age: 40
End: 2025-05-05

## 2025-05-05 NOTE — TELEPHONE ENCOUNTER
Action Requested: Summary for Provider     []  Critical Lab, Recommendations Needed  [] Need Additional Advice  []   FYI    []   Need Orders  [] Need Medications Sent to Pharmacy  []  Other     SUMMARY: Appointment scheduled appropriately.      Reason for call: Nausea  Onset: on and off for months    Future Appointments         Provider Department Appt Notes    In 3 days Sylvie Last PA-C [facility], [place], [address] [place] [address] Sent to triage   ir  consistent worsening nausea (thought endometriosis related, but doesn’t appear to be), stomach pain       Pt reports she has gastroparesis. Pt experiencing nausea on and off for months now. She usually takes omeprazole and thinks she needs to get back on a course. Pt taking zofran to help with the nausea. Offered same day appt with first available provider if pt wants to come in sooner. Pt declined and will keep appt on 5/8/25 as scheduled.    Reason for Disposition   Nausea is a chronic symptom (recurrent or ongoing AND present > 4 weeks)    Protocols used: Nausea-A-OH

## 2025-05-05 NOTE — TELEPHONE ENCOUNTER
Sending to triage due to symptoms below        Appointment for: Abiola Lindsey ([id number])   Visit type: MYCHART EXAM (2964)   5/8/2025 9:20 AM (20 minutes) with Sylvie Last in [facility] [address]      Patient comments:   Consistent worsening nausea (thought endometriosis related, but doesn’t appear to be), stomach pain

## 2025-05-08 ENCOUNTER — OFFICE VISIT (OUTPATIENT)
Dept: INTERNAL MEDICINE CLINIC | Facility: CLINIC | Age: 40
End: 2025-05-08
Payer: COMMERCIAL

## 2025-05-08 ENCOUNTER — LAB ENCOUNTER (OUTPATIENT)
Dept: LAB | Age: 40
End: 2025-05-08
Attending: PHYSICIAN ASSISTANT
Payer: COMMERCIAL

## 2025-05-08 VITALS
WEIGHT: 138.19 LBS | HEART RATE: 80 BPM | SYSTOLIC BLOOD PRESSURE: 108 MMHG | RESPIRATION RATE: 18 BRPM | HEIGHT: 63.78 IN | OXYGEN SATURATION: 99 % | TEMPERATURE: 97 F | BODY MASS INDEX: 23.88 KG/M2 | DIASTOLIC BLOOD PRESSURE: 70 MMHG

## 2025-05-08 DIAGNOSIS — R11.0 NAUSEA: ICD-10-CM

## 2025-05-08 DIAGNOSIS — R10.13 EPIGASTRIC ABDOMINAL PAIN: ICD-10-CM

## 2025-05-08 DIAGNOSIS — R10.13 EPIGASTRIC ABDOMINAL PAIN: Primary | ICD-10-CM

## 2025-05-08 LAB
ALBUMIN SERPL-MCNC: 4.9 G/DL (ref 3.2–4.8)
ALBUMIN/GLOB SERPL: 1.8 {RATIO} (ref 1–2)
ALP LIVER SERPL-CCNC: 82 U/L (ref 37–98)
ALT SERPL-CCNC: 30 U/L (ref 10–49)
ANION GAP SERPL CALC-SCNC: 10 MMOL/L (ref 0–18)
AST SERPL-CCNC: 27 U/L (ref ?–34)
BASOPHILS # BLD AUTO: 0.05 X10(3) UL (ref 0–0.2)
BASOPHILS NFR BLD AUTO: 0.9 %
BILIRUB SERPL-MCNC: 0.7 MG/DL (ref 0.3–1.2)
BUN BLD-MCNC: 8 MG/DL (ref 9–23)
CALCIUM BLD-MCNC: 9.8 MG/DL (ref 8.7–10.6)
CHLORIDE SERPL-SCNC: 104 MMOL/L (ref 98–112)
CO2 SERPL-SCNC: 25 MMOL/L (ref 21–32)
CREAT BLD-MCNC: 0.78 MG/DL (ref 0.55–1.02)
EGFRCR SERPLBLD CKD-EPI 2021: 100 ML/MIN/1.73M2 (ref 60–?)
EOSINOPHIL # BLD AUTO: 0.11 X10(3) UL (ref 0–0.7)
EOSINOPHIL NFR BLD AUTO: 1.9 %
ERYTHROCYTE [DISTWIDTH] IN BLOOD BY AUTOMATED COUNT: 11.6 %
FASTING STATUS PATIENT QL REPORTED: YES
GLOBULIN PLAS-MCNC: 2.7 G/DL (ref 2–3.5)
GLUCOSE BLD-MCNC: 81 MG/DL (ref 70–99)
HCT VFR BLD AUTO: 40.4 % (ref 35–48)
HGB BLD-MCNC: 13.9 G/DL (ref 12–16)
IMM GRANULOCYTES # BLD AUTO: 0.01 X10(3) UL (ref 0–1)
IMM GRANULOCYTES NFR BLD: 0.2 %
LIPASE SERPL-CCNC: 36 U/L (ref 12–53)
LYMPHOCYTES # BLD AUTO: 1.5 X10(3) UL (ref 1–4)
LYMPHOCYTES NFR BLD AUTO: 26.5 %
MCH RBC QN AUTO: 31.7 PG (ref 26–34)
MCHC RBC AUTO-ENTMCNC: 34.4 G/DL (ref 31–37)
MCV RBC AUTO: 92.2 FL (ref 80–100)
MONOCYTES # BLD AUTO: 0.48 X10(3) UL (ref 0.1–1)
MONOCYTES NFR BLD AUTO: 8.5 %
NEUTROPHILS # BLD AUTO: 3.5 X10 (3) UL (ref 1.5–7.7)
NEUTROPHILS # BLD AUTO: 3.5 X10(3) UL (ref 1.5–7.7)
NEUTROPHILS NFR BLD AUTO: 62 %
OSMOLALITY SERPL CALC.SUM OF ELEC: 285 MOSM/KG (ref 275–295)
PLATELET # BLD AUTO: 179 10(3)UL (ref 150–450)
POTASSIUM SERPL-SCNC: 4.2 MMOL/L (ref 3.5–5.1)
PROT SERPL-MCNC: 7.6 G/DL (ref 5.7–8.2)
RBC # BLD AUTO: 4.38 X10(6)UL (ref 3.8–5.3)
SODIUM SERPL-SCNC: 139 MMOL/L (ref 136–145)
WBC # BLD AUTO: 5.7 X10(3) UL (ref 4–11)

## 2025-05-08 PROCEDURE — G2211 COMPLEX E/M VISIT ADD ON: HCPCS | Performed by: PHYSICIAN ASSISTANT

## 2025-05-08 PROCEDURE — 99213 OFFICE O/P EST LOW 20 MIN: CPT | Performed by: PHYSICIAN ASSISTANT

## 2025-05-08 PROCEDURE — 3008F BODY MASS INDEX DOCD: CPT | Performed by: PHYSICIAN ASSISTANT

## 2025-05-08 PROCEDURE — 87338 HPYLORI STOOL AG IA: CPT | Performed by: PHYSICIAN ASSISTANT

## 2025-05-08 PROCEDURE — 83690 ASSAY OF LIPASE: CPT | Performed by: PHYSICIAN ASSISTANT

## 2025-05-08 PROCEDURE — 80053 COMPREHEN METABOLIC PANEL: CPT | Performed by: PHYSICIAN ASSISTANT

## 2025-05-08 PROCEDURE — 3074F SYST BP LT 130 MM HG: CPT | Performed by: PHYSICIAN ASSISTANT

## 2025-05-08 PROCEDURE — 3078F DIAST BP <80 MM HG: CPT | Performed by: PHYSICIAN ASSISTANT

## 2025-05-08 PROCEDURE — 85025 COMPLETE CBC W/AUTO DIFF WBC: CPT | Performed by: PHYSICIAN ASSISTANT

## 2025-05-08 RX ORDER — ONDANSETRON 4 MG/1
4 TABLET, ORALLY DISINTEGRATING ORAL EVERY 8 HOURS PRN
Qty: 30 TABLET | Refills: 1 | Status: SHIPPED | OUTPATIENT
Start: 2025-05-08

## 2025-05-08 RX ORDER — OMEPRAZOLE 40 MG/1
40 CAPSULE, DELAYED RELEASE ORAL DAILY
Qty: 90 CAPSULE | Refills: 1 | Status: SHIPPED | OUTPATIENT
Start: 2025-05-08 | End: 2026-05-03

## 2025-05-08 NOTE — PROGRESS NOTES
Abiola Lindsey is a 39 year old female.   Chief Complaint   Patient presents with    Nausea     EJ Rm 1- Pt is here for nausea due to the endometriosis for months but over the weekend it has worsen       HPI:      History of Present Illness  Abiola Lindsey is a 39 year old female who presents with persistent nausea and abdominal pain.    She has been experiencing persistent nausea since before her hysterectomy in February, initially attributing it to endometriosis. Despite the surgery, the nausea persists, occurring intermittently with periods of significant discomfort. Eating generally alleviates the nausea, but symptoms worsen over time. No vomiting is reported, but she occasionally uses Zofran, particularly during severe episodes, which she took for three consecutive days during a rough weekend.    Abdominal pain is primarily located along the top of her stomach. The pain is described as a tight feeling, associated with her known diagnosis of gastroparesis. No specific food triggers have been identified, and the pain and nausea occur randomly. She uses famotidine and omeprazole intermittently for relief, especially when experiencing major stomach pain from acidic foods, though symptoms eventually recur.    Her past medical history includes a diagnosis of gastroparesis made in adolescence. She has not undergone any abdominal surgeries other than the hysterectomy and retains her appendix and gallbladder. She has not had an endoscopy or colonoscopy since the initial gastroparesis diagnosis.     No fever, body aches, or vomiting. Bowel habits are usually regular, but Zofran contributes to constipation.        Allergies:  Allergies[1]   Current Meds:  Current Medications[2]     PMH:   Past Medical History[3]    ROS:   GENERAL: Negative for fever, chills and fatigue. NAD.  HENT: Negative for congestion, sore throat, and ear pain.  RESPIRATORY: Negative for cough, chest tightness, shortness of breath and  wheezing.    CV: Negative for chest pain, palpitations and leg swelling.   GI: Negative for vomiting, diarrhea, and blood in stool. +epigastric abd pain, +nausea   : Negative for dysuria, hematuria and difficulty urinating.   MUSCULOSKELETAL: Negative for myalgias, back pain, joint swelling, arthralgias and gait problem.   NEURO: Negative for dizziness, syncope, weakness, numbness, tingling and headaches.   PSYCH: The patient is not nervous/anxious. No depression.      PHYSICAL EXAM:    /70   Pulse 80   Temp 97.2 °F (36.2 °C) (Temporal)   Resp 18   Ht 5' 3.78\" (1.62 m)   Wt 138 lb 3.2 oz (62.7 kg)   LMP  (LMP Unknown)   SpO2 99%   BMI 23.89 kg/m²     GENERAL: NAD. A&Ox3  RESPIRATORY: CTAB, no R/R/W  CV: RRR, no murmurs.   ABD: Soft, non-distended. +bowel sounds. No rebound tenderness. +epigastric tenderness   PSYCH: Appropriate mood and affect.      ASSESSMENT/ PLAN:   There are no diagnoses linked to this encounter.     Health Maintenance Due   Topic Date Due    Annual Physical  Never done    Annual Depression Screening  01/01/2025       Assessment & Plan  Nausea  Intermittent nausea with malaise, relieved by eating and Zofran. Differential includes gastritis, gastric ulcer, H. pylori infection, etc. Omeprazole provides temporary relief but has long-term use concerns.  - Order blood work.  - Order stool test for H. pylori.  - Prescribe omeprazole post-stool sample collection.  - Prescribe dissolvable Zofran for nausea.  - Consider abdominal CT if tests inconclusive.  - Consider gastroenterology referral if symptoms persist.    Gastroparesis  Diagnosed in adolescence. Current nausea and discomfort may relate to gastroparesis.    Chronic pelvic pain in female  Significantly improved post-hysterectomy. No current pain.          Pt indicates understanding and agrees to the plan.     No follow-ups on file.    Sylvie Last PA-C         The following individual(s) verbally consented to be recorded  using ambient AI listening technology and understand that they can each withdraw their consent to this listening technology at any point by asking the clinician to turn off or pause the recording:    Patient name: Abiola Lindsey  Additional names:  n/a                 [1]   Allergies  Allergen Reactions    Cefuroxime SWELLING     esophagitis   [2]   Current Outpatient Medications   Medication Sig Dispense Refill    spironolactone 100 MG Oral Tab Take 1 tablet (100 mg total) by mouth daily. 30 tablet 11    QULIPTA 60 MG Oral Tab Take 60 mg by mouth daily.     [3]   Past Medical History:   Anxiety    Dysmenorrhea    Dyspareunia    Camelia-Danlos syndrome type III (HCC)    Endometriosis    Endometriosis    Esophageal reflux    Headache disorder    Hx of motion sickness    Migraine headache without aura    Migraines

## 2025-05-09 ENCOUNTER — LAB ENCOUNTER (OUTPATIENT)
Dept: LAB | Age: 40
End: 2025-05-09
Attending: PHYSICIAN ASSISTANT
Payer: COMMERCIAL

## 2025-05-09 DIAGNOSIS — R11.0 NAUSEA: ICD-10-CM

## 2025-05-09 DIAGNOSIS — R10.13 EPIGASTRIC ABDOMINAL PAIN: ICD-10-CM

## 2025-05-13 LAB — H PYLORI AG STL QL IA: NEGATIVE

## 2025-06-02 ENCOUNTER — HOSPITAL ENCOUNTER (OUTPATIENT)
Dept: CT IMAGING | Facility: HOSPITAL | Age: 40
Discharge: HOME | End: 2025-06-02
Attending: PHYSICIAN ASSISTANT
Payer: COMMERCIAL

## 2025-06-02 DIAGNOSIS — R10.13 EPIGASTRIC ABDOMINAL PAIN: ICD-10-CM

## 2025-06-02 DIAGNOSIS — G89.29 CHRONIC PELVIC PAIN IN FEMALE: ICD-10-CM

## 2025-06-02 DIAGNOSIS — R11.0 NAUSEA: ICD-10-CM

## 2025-06-02 DIAGNOSIS — K31.84 GASTROPARESIS: ICD-10-CM

## 2025-06-02 DIAGNOSIS — R10.2 CHRONIC PELVIC PAIN IN FEMALE: ICD-10-CM

## 2025-06-02 PROCEDURE — 74177 CT ABD & PELVIS W/CONTRAST: CPT | Performed by: PHYSICIAN ASSISTANT

## 2025-06-03 DIAGNOSIS — R92.30 DENSE BREAST TISSUE: Primary | ICD-10-CM

## 2025-06-05 ENCOUNTER — PATIENT MESSAGE (OUTPATIENT)
Age: 40
End: 2025-06-05

## 2025-06-05 DIAGNOSIS — K31.84 GASTROPARESIS: Primary | ICD-10-CM

## 2025-06-17 RX ORDER — ATOGEPANT 60 MG/1
60 TABLET ORAL DAILY
Refills: 0 | OUTPATIENT
Start: 2025-06-17

## 2025-06-17 NOTE — TELEPHONE ENCOUNTER
Qulipta 60 MG - marked as external / historical    Sent Snatch that Jerkyhart message to patient.

## 2025-06-20 ENCOUNTER — PATIENT MESSAGE (OUTPATIENT)
Dept: INTERNAL MEDICINE CLINIC | Facility: CLINIC | Age: 40
End: 2025-06-20

## 2025-06-20 NOTE — TELEPHONE ENCOUNTER
Patient requesting refill of Qulipta that she has been on from previous provider.   Per visit on 11/23/24:    7. Other migraine without status migrainosus, not intractable  Stable   Monitor     Sylvie gleason to refill?

## 2025-06-26 RX ORDER — ATOGEPANT 60 MG/1
60 TABLET ORAL DAILY
Qty: 90 TABLET | Refills: 1 | Status: SHIPPED | OUTPATIENT
Start: 2025-06-26

## 2025-07-30 ENCOUNTER — TELEPHONE (OUTPATIENT)
Dept: INTERNAL MEDICINE CLINIC | Facility: CLINIC | Age: 40
End: 2025-07-30

## 2025-07-30 DIAGNOSIS — N63.0 BREAST NODULE: Primary | ICD-10-CM

## 2025-08-21 ENCOUNTER — HOSPITAL ENCOUNTER (OUTPATIENT)
Dept: MAMMOGRAPHY | Age: 40
Discharge: HOME | End: 2025-08-21
Attending: PHYSICIAN ASSISTANT

## 2025-08-21 ENCOUNTER — HOSPITAL ENCOUNTER (OUTPATIENT)
Dept: ULTRASOUND IMAGING | Age: 40
Discharge: HOME | End: 2025-08-21
Attending: PHYSICIAN ASSISTANT

## 2025-08-21 DIAGNOSIS — N63.0 BREAST NODULE: ICD-10-CM

## 2025-08-21 PROCEDURE — 77066 DX MAMMO INCL CAD BI: CPT | Performed by: PHYSICIAN ASSISTANT

## 2025-08-21 PROCEDURE — 77062 BREAST TOMOSYNTHESIS BI: CPT | Performed by: PHYSICIAN ASSISTANT

## 2025-08-21 PROCEDURE — 76641 ULTRASOUND BREAST COMPLETE: CPT | Performed by: PHYSICIAN ASSISTANT

## (undated) DEVICE — 40580 - THE PINK PAD - ADVANCED TRENDELENBURG POSITIONING KIT: Brand: 40580 - THE PINK PAD - ADVANCED TRENDELENBURG POSITIONING KIT

## (undated) DEVICE — PROGRASP FORCEPS: Brand: ENDOWRIST

## (undated) DEVICE — AIRSEAL TRI-LUMEN LILTERED TUBE SET: Brand: AIRSEAL

## (undated) DEVICE — GYN LAP/ROBOTIC: Brand: MEDLINE INDUSTRIES, INC.

## (undated) DEVICE — MEGA SUTURECUT ND: Brand: ENDOWRIST

## (undated) DEVICE — SET TUBING DELTER CYSTO IRRIG L77IN DIA0.241IN BLDR NVENT

## (undated) DEVICE — SUT MCRYL 4-0 18IN PS-2 ABSRB UD 19MM 3/8 CIR

## (undated) DEVICE — VESSEL SEALER EXTEND: Brand: ENDOWRIST

## (undated) DEVICE — SLEEVE COMPR MD KNEE LEN SGL USE KENDALL SCD

## (undated) DEVICE — MONOPOLAR CURVED SCISSORS: Brand: ENDOWRIST

## (undated) DEVICE — LAPAROVUE VISIBILITY SYSTEM LAPAROSCOPIC SOLUTIONS: Brand: LAPAROVUE

## (undated) DEVICE — HUNTER GASPER TIP, DISPOSABLE: Brand: RENEW

## (undated) DEVICE — SEAL

## (undated) DEVICE — AIRSEAL 8 MM ACCESS PORT AND LOW PROFILE OBTURATOR WITH BLADELESS OPTICAL TIP, 120 MM LENGTH: Brand: AIRSEAL

## (undated) DEVICE — GLOVE SUR 7 SENSICARE PI PIP CRM PWD F

## (undated) DEVICE — COLUMN DRAPE

## (undated) DEVICE — FENESTRATED BIPOLAR FORCEPS: Brand: ENDOWRIST

## (undated) DEVICE — ARM DRAPE

## (undated) DEVICE — BLADELESS OBTURATOR: Brand: WECK VISTA

## (undated) DEVICE — ABSORBABLE WOUND CLOSURE DEVICE: Brand: SYNETURE

## (undated) DEVICE — VCARE MEDIUM, UTERINE MANIPULATOR, VAGINAL-CERVICAL-AHLUWALIA'S-RETRACTOR-ELEVATOR: Brand: VCARE

## (undated) DEVICE — TIP COVER ACCESSORY